# Patient Record
Sex: MALE | Race: WHITE | NOT HISPANIC OR LATINO | Employment: FULL TIME | URBAN - METROPOLITAN AREA
[De-identification: names, ages, dates, MRNs, and addresses within clinical notes are randomized per-mention and may not be internally consistent; named-entity substitution may affect disease eponyms.]

---

## 2017-01-03 ENCOUNTER — COMMUNICATION - HEALTHEAST (OUTPATIENT)
Dept: FAMILY MEDICINE | Facility: CLINIC | Age: 52
End: 2017-01-03

## 2017-04-03 ENCOUNTER — OFFICE VISIT - HEALTHEAST (OUTPATIENT)
Dept: FAMILY MEDICINE | Facility: CLINIC | Age: 52
End: 2017-04-03

## 2017-04-03 DIAGNOSIS — Z00.00 ROUTINE GENERAL MEDICAL EXAMINATION AT A HEALTH CARE FACILITY: ICD-10-CM

## 2017-04-03 DIAGNOSIS — Z00.00 ROUTINE HEALTH MAINTENANCE: ICD-10-CM

## 2017-04-03 DIAGNOSIS — Z87.19 HISTORY OF PANCREATITIS: ICD-10-CM

## 2017-04-03 DIAGNOSIS — R21 PENILE RASH: ICD-10-CM

## 2017-04-03 LAB
CHOLEST SERPL-MCNC: 139 MG/DL
FASTING STATUS PATIENT QL REPORTED: NO
HDLC SERPL-MCNC: 44 MG/DL
LDLC SERPL CALC-MCNC: 73 MG/DL
PSA SERPL-MCNC: 0.3 NG/ML (ref 0–3.5)
TRIGL SERPL-MCNC: 111 MG/DL

## 2017-04-03 ASSESSMENT — MIFFLIN-ST. JEOR: SCORE: 1789.47

## 2017-04-10 ENCOUNTER — COMMUNICATION - HEALTHEAST (OUTPATIENT)
Dept: FAMILY MEDICINE | Facility: CLINIC | Age: 52
End: 2017-04-10

## 2017-04-10 DIAGNOSIS — A60.00 GENITAL HERPES: ICD-10-CM

## 2017-05-15 ENCOUNTER — RECORDS - HEALTHEAST (OUTPATIENT)
Dept: ADMINISTRATIVE | Facility: OTHER | Age: 52
End: 2017-05-15

## 2017-05-16 ENCOUNTER — RECORDS - HEALTHEAST (OUTPATIENT)
Dept: ADMINISTRATIVE | Facility: OTHER | Age: 52
End: 2017-05-16

## 2017-06-15 ENCOUNTER — OFFICE VISIT - HEALTHEAST (OUTPATIENT)
Dept: FAMILY MEDICINE | Facility: CLINIC | Age: 52
End: 2017-06-15

## 2017-06-15 DIAGNOSIS — S29.011A STRAIN OF LEFT PECTORALIS MUSCLE: ICD-10-CM

## 2017-06-15 DIAGNOSIS — M77.11 LATERAL EPICONDYLITIS OF RIGHT ELBOW: ICD-10-CM

## 2017-06-15 DIAGNOSIS — A60.00 GENITAL HERPES: ICD-10-CM

## 2017-06-15 DIAGNOSIS — S46.219A BICEPS TENDON TEAR: ICD-10-CM

## 2017-06-15 ASSESSMENT — MIFFLIN-ST. JEOR: SCORE: 1786.07

## 2017-10-30 ENCOUNTER — OFFICE VISIT - HEALTHEAST (OUTPATIENT)
Dept: FAMILY MEDICINE | Facility: CLINIC | Age: 52
End: 2017-10-30

## 2017-10-30 ENCOUNTER — COMMUNICATION - HEALTHEAST (OUTPATIENT)
Dept: TELEHEALTH | Facility: CLINIC | Age: 52
End: 2017-10-30

## 2017-10-30 ENCOUNTER — COMMUNICATION - HEALTHEAST (OUTPATIENT)
Dept: FAMILY MEDICINE | Facility: CLINIC | Age: 52
End: 2017-10-30

## 2017-10-30 DIAGNOSIS — J02.9 PHARYNGITIS: ICD-10-CM

## 2017-10-30 DIAGNOSIS — J40 BRONCHITIS: ICD-10-CM

## 2017-10-30 ASSESSMENT — MIFFLIN-ST. JEOR: SCORE: 1798.54

## 2018-03-06 ENCOUNTER — OFFICE VISIT - HEALTHEAST (OUTPATIENT)
Dept: FAMILY MEDICINE | Facility: CLINIC | Age: 53
End: 2018-03-06

## 2018-03-06 ENCOUNTER — COMMUNICATION - HEALTHEAST (OUTPATIENT)
Dept: TELEHEALTH | Facility: CLINIC | Age: 53
End: 2018-03-06

## 2018-03-06 DIAGNOSIS — J06.9 URI (UPPER RESPIRATORY INFECTION): ICD-10-CM

## 2018-03-06 DIAGNOSIS — J02.9 SORE THROAT: ICD-10-CM

## 2018-03-06 LAB — DEPRECATED S PYO AG THROAT QL EIA: NORMAL

## 2018-03-06 ASSESSMENT — MIFFLIN-ST. JEOR: SCORE: 1862.04

## 2018-03-07 LAB — GROUP A STREP BY PCR: NORMAL

## 2018-08-13 ENCOUNTER — COMMUNICATION - HEALTHEAST (OUTPATIENT)
Dept: TELEHEALTH | Facility: CLINIC | Age: 53
End: 2018-08-13

## 2018-08-13 ENCOUNTER — OFFICE VISIT - HEALTHEAST (OUTPATIENT)
Dept: FAMILY MEDICINE | Facility: CLINIC | Age: 53
End: 2018-08-13

## 2018-08-13 DIAGNOSIS — R07.0 THROAT PAIN: ICD-10-CM

## 2018-08-13 DIAGNOSIS — J02.0 ACUTE STREPTOCOCCAL PHARYNGITIS: ICD-10-CM

## 2018-08-13 LAB — DEPRECATED S PYO AG THROAT QL EIA: ABNORMAL

## 2018-09-18 ENCOUNTER — OFFICE VISIT - HEALTHEAST (OUTPATIENT)
Dept: FAMILY MEDICINE | Facility: CLINIC | Age: 53
End: 2018-09-18

## 2018-09-18 DIAGNOSIS — H61.23 BILATERAL IMPACTED CERUMEN: ICD-10-CM

## 2018-09-18 ASSESSMENT — MIFFLIN-ST. JEOR: SCORE: 1834.83

## 2019-01-22 ENCOUNTER — OFFICE VISIT - HEALTHEAST (OUTPATIENT)
Dept: FAMILY MEDICINE | Facility: CLINIC | Age: 54
End: 2019-01-22

## 2019-01-22 ENCOUNTER — COMMUNICATION - HEALTHEAST (OUTPATIENT)
Dept: TELEHEALTH | Facility: CLINIC | Age: 54
End: 2019-01-22

## 2019-01-22 DIAGNOSIS — J02.9 PHARYNGITIS, UNSPECIFIED ETIOLOGY: ICD-10-CM

## 2019-01-22 DIAGNOSIS — J40 BRONCHITIS: ICD-10-CM

## 2019-05-13 ENCOUNTER — OFFICE VISIT - HEALTHEAST (OUTPATIENT)
Dept: FAMILY MEDICINE | Facility: CLINIC | Age: 54
End: 2019-05-13

## 2019-05-13 DIAGNOSIS — J40 BRONCHITIS: ICD-10-CM

## 2019-05-13 DIAGNOSIS — J02.9 PHARYNGITIS, UNSPECIFIED ETIOLOGY: ICD-10-CM

## 2019-05-13 DIAGNOSIS — M54.2 CERVICAL PAIN: ICD-10-CM

## 2019-11-12 ENCOUNTER — OFFICE VISIT - HEALTHEAST (OUTPATIENT)
Dept: FAMILY MEDICINE | Facility: CLINIC | Age: 54
End: 2019-11-12

## 2019-11-12 DIAGNOSIS — D12.6 ADENOMATOUS POLYP OF COLON, UNSPECIFIED PART OF COLON: ICD-10-CM

## 2019-11-12 DIAGNOSIS — R10.9 ABDOMINAL PAIN, UNSPECIFIED ABDOMINAL LOCATION: ICD-10-CM

## 2019-11-12 DIAGNOSIS — Z87.19 HISTORY OF PANCREATITIS: ICD-10-CM

## 2019-11-12 LAB
ALBUMIN SERPL-MCNC: 4 G/DL (ref 3.5–5)
ALP SERPL-CCNC: 84 U/L (ref 45–120)
ALT SERPL W P-5'-P-CCNC: 14 U/L (ref 0–45)
AST SERPL W P-5'-P-CCNC: 17 U/L (ref 0–40)
BASOPHILS # BLD AUTO: 0 THOU/UL (ref 0–0.2)
BASOPHILS NFR BLD AUTO: 1 % (ref 0–2)
BILIRUB DIRECT SERPL-MCNC: 0.2 MG/DL
BILIRUB SERPL-MCNC: 0.5 MG/DL (ref 0–1)
EOSINOPHIL # BLD AUTO: 0.2 THOU/UL (ref 0–0.4)
EOSINOPHIL NFR BLD AUTO: 4 % (ref 0–6)
ERYTHROCYTE [DISTWIDTH] IN BLOOD BY AUTOMATED COUNT: 12.2 % (ref 11–14.5)
HCT VFR BLD AUTO: 39 % (ref 40–54)
HGB BLD-MCNC: 13.5 G/DL (ref 14–18)
LIPASE SERPL-CCNC: 20 U/L (ref 0–52)
LYMPHOCYTES # BLD AUTO: 1.5 THOU/UL (ref 0.8–4.4)
LYMPHOCYTES NFR BLD AUTO: 24 % (ref 20–40)
MCH RBC QN AUTO: 30 PG (ref 27–34)
MCHC RBC AUTO-ENTMCNC: 34.5 G/DL (ref 32–36)
MCV RBC AUTO: 87 FL (ref 80–100)
MONOCYTES # BLD AUTO: 0.4 THOU/UL (ref 0–0.9)
MONOCYTES NFR BLD AUTO: 7 % (ref 2–10)
NEUTROPHILS # BLD AUTO: 3.9 THOU/UL (ref 2–7.7)
NEUTROPHILS NFR BLD AUTO: 64 % (ref 50–70)
PLATELET # BLD AUTO: 230 THOU/UL (ref 140–440)
PMV BLD AUTO: 7.6 FL (ref 7–10)
PROT SERPL-MCNC: 7.1 G/DL (ref 6–8)
RBC # BLD AUTO: 4.48 MILL/UL (ref 4.4–6.2)
WBC: 6.1 THOU/UL (ref 4–11)

## 2019-11-12 ASSESSMENT — MIFFLIN-ST. JEOR: SCORE: 1820.09

## 2019-11-15 ENCOUNTER — HOSPITAL ENCOUNTER (OUTPATIENT)
Dept: CT IMAGING | Facility: HOSPITAL | Age: 54
Discharge: HOME | End: 2019-11-15
Attending: FAMILY MEDICINE

## 2019-11-15 ENCOUNTER — COMMUNICATION - HEALTHEAST (OUTPATIENT)
Dept: TELEHEALTH | Facility: CLINIC | Age: 54
End: 2019-11-15

## 2019-11-15 DIAGNOSIS — Z87.19 HISTORY OF PANCREATITIS: ICD-10-CM

## 2019-11-15 DIAGNOSIS — R10.9 ABDOMINAL PAIN, UNSPECIFIED ABDOMINAL LOCATION: ICD-10-CM

## 2020-11-04 ENCOUNTER — VIRTUAL VISIT (OUTPATIENT)
Dept: FAMILY MEDICINE | Facility: OTHER | Age: 55
End: 2020-11-04

## 2020-11-04 NOTE — PROGRESS NOTES
"Date: 2020 12:22:48  Clinician: Lexie Hodgson  Clinician NPI: [id number]  Patient: Evangelist Singh  Patient : 1965  Patient Address: [address], [place] [address]  Patient Phone: [phone]  Visit Protocol: URI  Patient Summary:  Evangelist is a 55 year old ( : 1965 ) male who initiated a [facility] Visit for COVID-19 (Coronavirus) evaluation and screening. When asked the question \"Please sign me up to receive news, health information and promotions from [facility].\", Evangelist responded \"Yes\".    When asked when his symptoms started, Evnagelist reported that he does not have any symptoms.   He denies taking antibiotic medication in the past month and having recent facial or sinus surgery in the past 60 days.    Pertinent COVID-19 (Coronavirus) information  Evangelist does not work or volunteer as healthcare worker or a . In the past 14 days, Evangelist has not worked or volunteered at a healthcare facility or group living setting.   In the past 14 days, he also has not lived in a congregate living setting.   Evangelist has had a close contact with a laboratory-confirmed COVID-19 patient in the last 14 days. He was not exposed at his work. Date Evangelist was exposed to the laboratory-confirmed COVID-19 patient: 2020   Additional information about contact with COVID-19 (Coronavirus) patient as reported by the patient (free text): [place] Simons, in [facility]   Evangelist is not living in the same household with the COVID-19 positive patient. He was in an enclosed space for greater than 15 minutes with the COVID-19 patient.   During the encounter, neither were wearing masks.   Since 2019, Evangelist has not been tested for COVID-19 and has not had upper respiratory infection or influenza-like illness.   Pertinent medical history  Evangelist needs a return to work/school note.   Weight: 190 lbs   Evangelist does not smoke or use smokeless tobacco.   Weight: 190 lbs    MEDICATIONS: No current medications, ALLERGIES: " NKDA  Clinician Response:  Dear Evangelist,   Based on your exposure to COVID-19 (coronavirus), we would like to test you for this virus.  1. Please call [phone] to schedule your visit. Explain that you were referred by [facility] to have a COVID-19 test. Be ready to share [facility].  * If you need to schedule in [facility] please call [phone] or for [place] employees please call [phone].   * If you need to schedule in the [place] area please call [phone]. [place] employees call [phone].   The following will serve as your written order for this COVID Test, ordered by me, for the indication of suspected COVID [Z20.828]: The test will be ordered in [employer], our electronic health record, after you are scheduled. It will show as ordered and authorized by Jairo Mederos MD.  Order: COVID-19 (coronavirus) PCR for ASYMPTOMATIC EXPOSURE testing from [facility].   If you know you have had close contact with someone who tested positive, you should be quarantined for 14 days after this exposure. You should stay in quarantine for the14 days even if the covid test is negative, the optimal time to test after exposure is 5-7 days from the exposure  Quarantine means   What should I do?  For safety, it's very important to follow these rules. Do this for 14 days after the date you were last exposed to the virus..  Stay home and away from others. Don't go to school or anywhere else. Generally quarantine means staying home from work but there are some exceptions to this. Please contact your workplace.   No hugging, kissing or shaking hands.  Don't let anyone visit.  Cover your mouth and nose with a mask, tissue or washcloth to avoid spreading germs.  Wash your hands and face often. Use soap and water.  What are the symptoms of COVID-19?  The most common symptoms are cough, fever and trouble breathing. Less common symptoms include headache, body aches, fatigue (feeling very tired), chills, sore throat, stuffy  or runny nose, diarrhea (loose poop), loss of taste or smell, belly pain, and nausea or vomiting (feeling sick to your stomach or throwing up).  After 14 days, if you have still don't have symptoms, you likely don't have this virus.  If you develop symptoms, follow these guidelines.  If you're normally healthy: Please start another [facility] visit to report your symptoms. Go to [email or web address].  If you have a serious health problem (like cancer, heart failure, an organ transplant or kidney disease): Call your specialty clinic. Let them know that you might have COVID-19.  2. When it's time for your COVID test:  Stay at least 6 feet away from others. (If someone will drive you to your test, stay in the backseat, as far away from the  as you can.)  Cover your mouth and nose with a mask, tissue or washcloth.  Go straight to the testing site. Don't make any stops on the way there or back.  Please note  Caregivers in these groups are at risk for severe illness due to COVID-19:  o People 65 years and older  o People who live in a nursing home or long-term care facility  o People with chronic disease (lung, heart, cancer, diabetes, kidney, liver, immunologic)  o People who have a weakened immune system, including those who:  Are in cancer treatment  Take medicine that weakens the immune system, such as corticosteroids  Had a bone marrow or organ transplant  Have an immune deficiency  Have poorly controlled HIV or AIDS  Are obese (body mass index of 40 or higher)  Smoke regularly  Where can I get more information?   [employer] -- About COVID-19: [email or web address]  CDC -- What to Do If You're Sick: www.cdc.gov/coronavirus/2019-ncov/about/steps-when-sick.html  CDC -- Ending Home Isolation: www.cdc.gov/coronavirus/2019-ncov/hcp/disposition-in-home-patients.html  CDC -- Caring for Someone: www.cdc.gov/coronavirus/2019-ncov/if-you-are-sick/care-for-someone.html  [facility] -- Interim Guidance for Hospital Discharge to  Home: www.health.[facility].[place]./diseases/coronavirus/hcp/hospdischarge.pdf  [facility] clinical trials (COVID-19 research studies): clinicalaffairs.[facility]/umn-clinical-trials  Below are the COVID-19 hotlines at the [place] Department of Health ([facility]). Interpreters are available.  For health questions: Call 195-900-4743 or 1-603.474.3287 (7 a.m. to 7 p.m.)  For questions about schools and childcare: Call 936-726-5214 or 1-850.494.7899 (7 a.m. to 7 p.m.)    Diagnosis: Contact with and (suspected) exposure to other viral communicable diseases  Diagnosis ICD: Z20.828

## 2020-11-11 ENCOUNTER — AMBULATORY - HEALTHEAST (OUTPATIENT)
Dept: FAMILY MEDICINE | Facility: CLINIC | Age: 55
End: 2020-11-11

## 2020-11-11 DIAGNOSIS — Z20.822 SUSPECTED COVID-19 VIRUS INFECTION: ICD-10-CM

## 2020-11-12 ENCOUNTER — AMBULATORY - HEALTHEAST (OUTPATIENT)
Dept: FAMILY MEDICINE | Facility: CLINIC | Age: 55
End: 2020-11-12

## 2020-11-12 DIAGNOSIS — Z20.822 SUSPECTED COVID-19 VIRUS INFECTION: ICD-10-CM

## 2020-11-14 ENCOUNTER — COMMUNICATION - HEALTHEAST (OUTPATIENT)
Dept: SCHEDULING | Facility: CLINIC | Age: 55
End: 2020-11-14

## 2020-11-14 ENCOUNTER — COMMUNICATION - HEALTHEAST (OUTPATIENT)
Dept: LAB | Facility: CLINIC | Age: 55
End: 2020-11-14

## 2020-11-17 ENCOUNTER — OFFICE VISIT - HEALTHEAST (OUTPATIENT)
Dept: FAMILY MEDICINE | Facility: CLINIC | Age: 55
End: 2020-11-17

## 2020-11-17 DIAGNOSIS — U07.1 2019 NOVEL CORONAVIRUS DISEASE (COVID-19): ICD-10-CM

## 2021-04-29 ENCOUNTER — AMBULATORY - HEALTHEAST (OUTPATIENT)
Dept: NURSING | Facility: CLINIC | Age: 56
End: 2021-04-29

## 2021-05-20 ENCOUNTER — AMBULATORY - HEALTHEAST (OUTPATIENT)
Dept: NURSING | Facility: CLINIC | Age: 56
End: 2021-05-20

## 2021-05-28 NOTE — PROGRESS NOTES
Subjective: Patient comes in for evaluation this 54-year-old male has had a sore throat cough coughing up some colored phlegm.    Patient denies any fever denies any ear pain.    He is been sick for about a week    Patient also said some discomfort in through the right neck patient has muscle strain.  Seems to be localized there is no radicular component no adenopathy posteriorly please see below    Denies any shortness of breath denies any hemoptysis.        Tobacco status: He  reports that he has never smoked. He has never used smokeless tobacco.    Patient Active Problem List    Diagnosis Date Noted     Adenomatous colon polyp 05/22/2017       Current Outpatient Medications   Medication Sig Dispense Refill     amoxicillin-clavulanate (AUGMENTIN) 875-125 mg per tablet Take 1 tablet by mouth 2 (two) times a day for 10 days. 20 tablet 0     No current facility-administered medications for this visit.        ROS: 10 point review of systems positive as discussed above otherwise negative    Objective:    /79 (Patient Site: Left Arm, Patient Position: Sitting, Cuff Size: Adult Large)   Pulse 68   Resp 18   Wt 210 lb (95.3 kg)   BMI 27.33 kg/m    Body mass index is 27.33 kg/m .    General appearance no acute distress vital signs are stable he is afebrile, temp is 98    Neck with mild anterior adenopathy, slightly tender.    Pupils react normally canals and TMs were normal    Oropharynx mild erythema no exudate    Lungs coarse breath sounds cleared with cough    Heart was regular S1-S2    Right neck posterior had some tenderness to palpation good range of motion with the neck little discomfort when turning towards the right but no radicular component.    Skin was normal no rash        Assessment:  1. Bronchitis  amoxicillin-clavulanate (AUGMENTIN) 875-125 mg per tablet   2. Pharyngitis, unspecified etiology  amoxicillin-clavulanate (AUGMENTIN) 875-125 mg per tablet   3. Cervical pain       Treated with Augmentin  for the bronchitis/pharyngitis.  875 mg twice daily with food for 10 days    Cervical pain/strain.  Range of motion exercises use Tylenol  Plan: Follow-up as needed    This transcription uses voice recognition software, which may contain typographical errors.

## 2021-05-30 VITALS — BODY MASS INDEX: 25.54 KG/M2 | WEIGHT: 199 LBS | HEIGHT: 74 IN

## 2021-05-31 VITALS — WEIGHT: 201 LBS | BODY MASS INDEX: 25.8 KG/M2 | HEIGHT: 74 IN

## 2021-05-31 VITALS — BODY MASS INDEX: 26.51 KG/M2 | WEIGHT: 200 LBS | HEIGHT: 73 IN

## 2021-06-01 VITALS — BODY MASS INDEX: 27.59 KG/M2 | HEIGHT: 74 IN | WEIGHT: 215 LBS

## 2021-06-01 VITALS — WEIGHT: 207 LBS | BODY MASS INDEX: 26.94 KG/M2

## 2021-06-02 VITALS — BODY MASS INDEX: 27.46 KG/M2 | WEIGHT: 211 LBS

## 2021-06-02 VITALS — WEIGHT: 209 LBS | BODY MASS INDEX: 26.82 KG/M2 | HEIGHT: 74 IN

## 2021-06-03 VITALS — WEIGHT: 210 LBS | BODY MASS INDEX: 27.33 KG/M2

## 2021-06-03 NOTE — PROGRESS NOTES
"Subjective: Patient comes in for evaluation this 54-year-old male has had some abdominal pain over the last 2 to 3 weeks.  He is felt some constipation he felt some bloating abdomen pain is somewhat diffuse sometimes in the lower sometimes more in the mid abdomen.    He does have a history of acute pancreatitis back in July 2016, had normal lipase but CT scan was abnormal.  It did show improvement on follow-up later that summer.    He also had a colonoscopy in 2017 had an adenomatous polyp is due again in May 2022.    Patient states that he changed his diet and things have improved but still has some ongoing discomfort.    He is going with a low-fat diet.    I did check labs with CBC lipase and LFTs.  We will also recheck a CT scan of the abdomen and pelvis.  Please see below.    Tobacco status: He  reports that he has never smoked. He has never used smokeless tobacco.    Patient Active Problem List    Diagnosis Date Noted     Adenomatous colon polyp 05/22/2017       No current outpatient medications on file.     No current facility-administered medications for this visit.        ROS: 10 point review of systems positive as outlined above otherwise negative    Objective:    /70 (Patient Site: Left Arm, Patient Position: Sitting, Cuff Size: Adult Regular)   Pulse (!) 55   Temp 98.1  F (36.7  C) (Oral)   Resp 16   Ht 6' 2\" (1.88 m)   Wt 204 lb (92.5 kg)   SpO2 100%   BMI 26.19 kg/m    Body mass index is 26.19 kg/m .      General appearance no acute distress    HEENT neck is supple oropharynx clear    Lungs clear no rales or rhonchi heart was regular S1-S2.    Abdomen is soft no guarding or rebound but has discomfort in the mid.  Occasional back discomfort he states as well    Lower extremities without edema skin was normal no jaundice change hemoglobin 13.5 white count 6100 normal platelets.    Lipase and LFTs pending    CT scan of abdomen pelvis also ordered.    Results for orders placed or performed in " visit on 11/12/19   HM1 (CBC with Diff)   Result Value Ref Range    WBC 6.1 4.0 - 11.0 thou/uL    RBC 4.48 4.40 - 6.20 mill/uL    Hemoglobin 13.5 (L) 14.0 - 18.0 g/dL    Hematocrit 39.0 (L) 40.0 - 54.0 %    MCV 87 80 - 100 fL    MCH 30.0 27.0 - 34.0 pg    MCHC 34.5 32.0 - 36.0 g/dL    RDW 12.2 11.0 - 14.5 %    Platelets 230 140 - 440 thou/uL    MPV 7.6 7.0 - 10.0 fL    Neutrophils % 64 50 - 70 %    Lymphocytes % 24 20 - 40 %    Monocytes % 7 2 - 10 %    Eosinophils % 4 0 - 6 %    Basophils % 1 0 - 2 %    Neutrophils Absolute 3.9 2.0 - 7.7 thou/uL    Lymphocytes Absolute 1.5 0.8 - 4.4 thou/uL    Monocytes Absolute 0.4 0.0 - 0.9 thou/uL    Eosinophils Absolute 0.2 0.0 - 0.4 thou/uL    Basophils Absolute 0.0 0.0 - 0.2 thou/uL       Assessment:  1. Abdominal pain, unspecified abdominal location  Lipase    HM1(CBC and Differential)    Hepatic Profile    CT Abdomen Pelvis With Oral With IV Contrast    HM1 (CBC with Diff)   2. History of pancreatitis  Lipase    CT Abdomen Pelvis With Oral With IV Contrast   3. Adenomatous polyp of colon, unspecified part of colon       Testing abdominal pain with history of pancreatitis in the past please see above discussion    Continue low-fat diet.    Colonoscopy due again May 2022    Plan: As outlined above    This transcription uses voice recognition software, which may contain typographical errors.

## 2021-06-04 VITALS
HEIGHT: 74 IN | RESPIRATION RATE: 16 BRPM | HEART RATE: 55 BPM | DIASTOLIC BLOOD PRESSURE: 70 MMHG | BODY MASS INDEX: 26.18 KG/M2 | WEIGHT: 204 LBS | SYSTOLIC BLOOD PRESSURE: 100 MMHG | TEMPERATURE: 98.1 F | OXYGEN SATURATION: 100 %

## 2021-06-09 NOTE — PROGRESS NOTES
"Subjective: This patient comes in for evaluation is a 52-year-old male patient had some diarrhea back in December that has now resolved    He has had some previous pancreatitis but that has resolved also.    He does need a colonoscopy again referral    He had had some labs in December including CMP which was normal CBC normal lipase normal    Additional labs today include PSA and lipid.    Only concern has to do with a rash at the base of the shaft of the penis.  It itches he scratched it some    There is some scabs there there is no vesicular component I did swab it for herpes.  He does not have any inguinal adenopathy testes descended normally.    Please see below.    He occasionally gets some left buttocks pain in through the hamstring insertion otherwise no musculoskeletal issues.    He additionally has on his right foot a coronary trim this with a 15 blade he will try to keep that trimmed down with a pumice stone.  He has a small protrusion off the fifth metatarsal head area.    Tobacco status: He  reports that he has never smoked. He has never used smokeless tobacco.    There are no active problems to display for this patient.      No current outpatient prescriptions on file.     No current facility-administered medications for this visit.        ROS:   10 point review of systems negative other than as outlined above    Objective:    /70 (Patient Site: Right Arm, Patient Position: Sitting, Cuff Size: Adult Large)  Pulse 72  Temp 98  F (36.7  C) (Oral)   Resp 16  Ht 6' 1.5\" (1.867 m)  Wt 199 lb (90.3 kg)  BMI 25.9 kg/m2  Body mass index is 25.9 kg/(m^2).      General appearance no acute distress.    HEENT: Oropharynx is clear pupils react normally canals and TMs normal mild wax.    Neck without adenopathy no bruit no thyroid enlargement    Lungs clear no rales or rhonchi, heart regular S1-S2.    Abdomen is soft bowel sounds are normal no masses no tenderness no guarding rebound    Genital exam as " outlined above no hernia no inguinal adenopathy    Rashes outlined in the shaft of the penis.    Skin otherwise normal    No tenderness in through the back or buttocks area negative straight leg raising normal pulses no edema.    Off the edge of the right fifth metatarsal he does have a corn which was trimmed off with a 15 blade no complication.    Labs PSA and lipid pending    Also herpes culture obtained but there was not much drainage.        Assessment:  1. Routine general medical examination at a health care facility  Ambulatory referral for Colonoscopy    Lipid Cascade RANDOM    PSA (Prostatic-Specific Antigen), Annual Screen   2. Routine health maintenance     3. History of pancreatitis     4. Penile rash  Herpes Simplex PCR (not CSF)     Stable physical    Await labs    No sign of any pancreatitis or diarrhea.    Health maintenance we will get colonoscopy    Plan: As outlined above patient will be contacted on his cell at [phone]    This transcription uses voice recognition software, which may contain typographical errors.

## 2021-06-11 NOTE — PROGRESS NOTES
"Subjective: This patient comes in for evaluation is a 52-year-old male patient has had some discomfort in through his left biceps inner arm and pectoralis area.  He has started to lift some weights using a kettle ball which was new this occurred over the last week.  He denies any other specific injury although he does work manual labor and has noticed a little bit of discomfort with activity    No shortness of breath no chest pressure at rest.    Also said some discomfort through the right elbow in through the lateral epicondyle.  He has been playing [employer] recently and had just started doing this also.    Tobacco status: He  reports that he has never smoked. He has never used smokeless tobacco.    Patient Active Problem List    Diagnosis Date Noted     Adenomatous colon polyp 05/22/2017       No current outpatient prescriptions on file.     No current facility-administered medications for this visit.        ROS:   Review of systems negative other than as outlined above    Objective:    /70 (Patient Site: Right Arm, Patient Position: Sitting, Cuff Size: Adult Regular)  Pulse 60  Temp 97  F (36.1  C) (Oral)   Resp 16  Ht 6' 1\" (1.854 m)  Wt 200 lb (90.7 kg)  BMI 26.39 kg/m2  Body mass index is 26.39 kg/(m^2).      General appearance no acute distress    Tenderness in through the right lateral epicondyle on palpation also some discomfort with dorsiflexion.    He has a little discomfort through the left pectoralis and also looks like he has this partial biceps tear and through the insertion at the elbow of the biceps tendon.    He has normal strength through the biceps triceps deltoid and pectoralis.    No rashes.    Lungs were clear no rales or rhonchi heart was regular S1-S2 rate in the 60s blood pressure look good        Assessment:  1. Lateral epicondylitis of right elbow     2. Strain of left pectoralis muscle     3. Biceps tendon tear     4. Genital herpes       Lateral epicondylitis right elbow also " strain of left pectoralis muscle and biceps tear of the left muscle.    Also had discussion regarding the genital herpes he has not had any further symptoms he actually was clearing up so he did not go on the famciclovir that which was prescribed.  I did discuss taking that if he gets recurrence discussed safe sex regarding herpes etc.    Plan: As outlined above, will use some anti-inflammatories discussed overuse phenomenon    Discussed that I do not think any treatment is indicated for the partial biceps tendon disruption.    Follow-up as needed    This transcription uses voice recognition software, which may contain typographical errors.

## 2021-06-13 NOTE — TELEPHONE ENCOUNTER
Coronavirus (COVID-19) Notification    Reason for call  Notify of POSITIVE  COVID-19 lab result, assess symptoms,  review [facility] recommendations    Lab Result   Lab test for 2019-nCoV rRt-PCR or SARS-COV-2 PCR  Oropharyngeal AND/OR nasopharyngeal swabs were POSITIVE for 2019-nCoV RNA [OR] SARS-COV-2 RNA (COVID-19) RNA     We have been unable to reach Patient by phone at this time to notify of their Positive COVID-19 result.  Left voicemail message requesting a call back to [phone] [facility] for results.        POSITIVE COVID-19 Letter sent.    [Name]  Kamini Dersa RN

## 2021-06-13 NOTE — TELEPHONE ENCOUNTER
Patient needs to schedule virtual visit with me.    Return to work is not based on a second test, as based on a number of things like how long he has had symptoms when the fever was how he is doing now etc.    Please schedule virtual visit

## 2021-06-13 NOTE — TELEPHONE ENCOUNTER
Future Appointments     Provider Department Center   11/17/2020 2:00 PM (Arrive by 1:45 PM) Chidi Cortez MD [facility]

## 2021-06-13 NOTE — PROGRESS NOTES
"Subjective: This patient comes in for evaluation.  This is a 52-year-old male.  His son has been sick for about 6 weeks with sore throat and cough congestion.    Patient developed symptoms about 3-4 weeks ago he initially had some congestion then he had a slight cough and he got a sore throat he seemed to get better and then got worse again now coughing up some yellowish phlegm.    He may have been exposed to strep and a neighbor.  He thinks the neighbor is a carrier of strep.    Otherwise denies fever chills rash denies any diarrhea or vomiting    Tobacco status: He  reports that he has never smoked. He has never used smokeless tobacco.    Patient Active Problem List    Diagnosis Date Noted     Adenomatous colon polyp 05/22/2017       Current Outpatient Prescriptions   Medication Sig Dispense Refill     azithromycin (ZITHROMAX Z-KATHYA) 250 MG tablet Take 2 tablets (500 mg) on  Day 1,  followed by 1 tablet (250 mg) once daily on Days 2 through 5. 6 tablet 0     No current facility-administered medications for this visit.        ROS:   Review of systems negative other than as outlined above    Objective:    /78 (Patient Site: Right Arm, Patient Position: Sitting, Cuff Size: Adult Regular)  Pulse (!) 59  Temp 97  F (36.1  C) (Oral)   Resp 16  Ht 6' 1.5\" (1.867 m)  Wt 201 lb (91.2 kg)  SpO2 96% Comment: at rest with room air  BMI 26.16 kg/m2  Body mass index is 26.16 kg/(m^2).      General appearance no acute distress.    HEENT neck without adenopathy oropharynx is thickened for erythema no significant adenopathy in through the neck.  No exudate    Strep was negative    Canals and TMs normal    Lungs some coarse breath sounds, no rales or rhonchi heart regular S1-S2 rate in the 60 range    O2 sat 96%.    Skin was normal    Results for orders placed or performed in visit on 10/30/17   Rapid Strep A Screen-Throat   Result Value Ref Range    Rapid Strep A Antigen No Group A Strep detected, presumptive negative " No Group A Strep detected, presumptive negative       Assessment:  1. Bronchitis  azithromycin (ZITHROMAX Z-KATHYA) 250 MG tablet   2. Pharyngitis  Rapid Strep A Screen-Throat    Group A Strep, RNA Direct Detection, Throat     Patient has some bronchitis with some secondary bacterial infection symptoms we will treat with azithromycin push fluids get rest    Plan: Follow up if not improved    This transcription uses voice recognition software, which may contain typographical errors.

## 2021-06-13 NOTE — TELEPHONE ENCOUNTER
Pt is calling in about his Covid 19 results. Pt had test done Thursday and it was positive, and he was told to call triage. Discussed isolation, and home care measures. Pt reports fever is gone, and headache is starting to subside.     Pt would like to have another Covid 19 test ordered for himself so he can return to work,  and if possible also for his 2 sons.     Please call Evangelist at [phone]    Fox Ulrich RN [facility] Triage/Medication Refill

## 2021-06-13 NOTE — PROGRESS NOTES
"Evangelist Singh is a 55 y.o. male who is being evaluated via a billable video visit.      The patient has been notified of following:     \"This video visit will be conducted via a call between you and your physician/provider. We have found that certain health care needs can be provided without the need for an in-person physical exam.  This service lets us provide the care you need with a video conversation.  If a prescription is necessary we can send it directly to your pharmacy.  If lab work is needed we can place an order for that and you can then stop by our lab to have the test done at a later time.    Video visits are billed at different rates depending on your insurance coverage. Please reach out to your insurance provider with any questions.    If during the course of the call the physician/provider feels a video visit is not appropriate, you will not be charged for this service.\"    Patient has given verbal consent to a Video visit? Yes  How would you like to obtain your AVS? AVS Preference: MyChart.  If dropped by the video visit, the video invitation should be sent to: Text to cell phone: [phone]   Will anyone else be joining your video visit? No        Video Start Time: 2:19 pm    Additional provider notes    Subjective: Patient had a virtual visit, video, due to the coronavirus pandemic.    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    Patient had fever on the 10th and 11th and a cough really no real shortness of breath but gets winded a little easier a little more fatigued.    No further muscle aches he has had some head funny feeling like dizziness no real congestion smell and taste are too bad    No rash no swollen legs.    He is now about " 7 days since the beginning of symptoms he is definitely improving.    If he continues to improve and is essentially symptom-free he should be able to return to work on 11/21/2020 based on clinical criteria.    We will have his son Tiago and his stepson Farrukh get checked also I put in orders for them    Tobacco status: He  reports that he has never smoked. He has never used smokeless tobacco.    Patient Active Problem List    Diagnosis Date Noted     Adenomatous colon polyp 05/22/2017       No current outpatient medications on file.     No current facility-administered medications for this visit.        ROS:   10 point review of systems positive as outlined above otherwise negative    Objective:    There were no vitals taken for this visit.  There is no height or weight on file to calculate BMI.      General appearance: No acute distress    HEENT no nasal congestion no sore throat    Is not having any coughing    Lungs: Nonlabored breathing no wheezing.    Heart: No palpitations or rapid heart rate    No leg swelling no skin rashes.    Abdomen nontender no GI issues.  No swelling    Results for orders placed or performed in visit on 11/12/20   COVID-19 Virus PCR MRF    Specimen: Nasopharyngeal   Result Value Ref Range    COVID-19 VIRUS SPECIMEN SOURCE Nasopharyngeal     2019-nCOV Detected, Abnormal Result (!!)        Assessment:  1. 2019 novel coronavirus disease (COVID-19)       COVID-19 please see above discussion    Plan: As outlined above, able to return to work on November 21 if symptom-free    This transcription uses voice recognition software, which may contain typographical errors.      Video-Visit Details    Type of service:  Video Visit    Video End Time (time video stopped): 2:34 PM  Originating Location (pt. Location): Home    Distant Location (provider location):  [facility]     Platform used for Video Visit: Terry Cortez MD

## 2021-06-15 PROBLEM — D12.6 ADENOMATOUS COLON POLYP: Status: ACTIVE | Noted: 2017-05-22

## 2021-06-16 NOTE — PROGRESS NOTES
"Subjective: Patient comes in for evaluation he has been exposed to strep.  He has a red throat is been running a low-grade fever.  He does have some mild anterior adenopathy    He was exposed to his girlfriend's daughter.    He has had a headache some slight discomfort through the ears    Mild cough nonproductive no myalgias symptoms.    Tobacco status: He  reports that he has never smoked. He has never used smokeless tobacco.    Patient Active Problem List    Diagnosis Date Noted     Adenomatous colon polyp 05/22/2017       Current Outpatient Prescriptions   Medication Sig Dispense Refill     azithromycin (ZITHROMAX Z-KATHYA) 250 MG tablet Take 2 tablets (500 mg) on  Day 1,  followed by 1 tablet (250 mg) once daily on Days 2 through 5. 6 tablet 0     No current facility-administered medications for this visit.        ROS:   Review of systems negative other than as outlined above    Objective:    /76 (Patient Site: Left Arm, Patient Position: Sitting, Cuff Size: Adult Large)  Pulse 64  Temp 97.5  F (36.4  C) (Oral)   Resp 12  Ht 6' 1.5\" (1.867 m)  Wt 215 lb (97.5 kg) Comment: with his snow boots  SpO2 95% Comment: at rest with room air  BMI 27.98 kg/m2  Body mass index is 27.98 kg/(m^2).      General appearance no acute distress    HEENT neck was supple he did have some anterior adenopathy slightly tender no posterior nodes    Canals and TMs normal    Oropharynx with quite erythematous posterior pharynx and tonsillar area, no exudate no abscess    Lungs are clear no rales or rhonchi, heart was regular rate in the 60s O2 sat 95%.        Results for orders placed or performed in visit on 03/06/18   Rapid Strep A Screen-Throat   Result Value Ref Range    Rapid Strep A Antigen No Group A Strep detected, presumptive negative No Group A Strep detected, presumptive negative       Assessment:  1. URI (upper respiratory infection)  azithromycin (ZITHROMAX Z-KATHYA) 250 MG tablet   2. Sore throat  Rapid Strep A " Screen-Throat    Group A Strep, RNA Direct Detection, Throat     We will treat with azithromycin a pack    Push fluids get rest use Tylenol    Office today follow-up if not improving    Plan: As discussed    This transcription uses voice recognition software, which may contain typographical errors.

## 2021-06-19 NOTE — PROGRESS NOTES
Subjective: Patient comes in for evaluation this 53-year-old male has had a sore throats been going on for about 3-4 days has a headache trouble swallowing mild cough is felt warm at times no real fever    No vomiting or nausea    No rashes.    Tobacco status: He  reports that he has never smoked. He has never used smokeless tobacco.    Patient Active Problem List    Diagnosis Date Noted     Adenomatous colon polyp 05/22/2017       Current Outpatient Prescriptions   Medication Sig Dispense Refill     amoxicillin (AMOXIL) 875 MG tablet Take 1 tablet (875 mg total) by mouth 2 (two) times a day for 10 days. 20 tablet 0     No current facility-administered medications for this visit.        ROS:   Review of systems positive as outlined otherwise negative    Objective:    /74 (Patient Site: Right Arm, Patient Position: Sitting, Cuff Size: Adult Large)  Pulse 72  Temp 98.7  F (37.1  C) (Oral)   Resp 20  Wt 207 lb (93.9 kg)  BMI 26.94 kg/m2  Body mass index is 26.94 kg/(m^2).      General appearance tired    Afebrile    Neck with some mild anterior nodes no posterior nodes    Oropharynx with erythema posteriorly no exudate    Lungs clear no rales or rhonchi heart regular rate at 70    Skin without rash    No peripheral edema    Results for orders placed or performed in visit on 08/13/18   Rapid Strep A Screen- Throat Swab   Result Value Ref Range    Rapid Strep A Antigen Group A Strep detected (!) No Group A Strep detected, presumptive negative       Assessment:  1. Acute streptococcal pharyngitis  amoxicillin (AMOXIL) 875 MG tablet   2. Throat pain  Rapid Strep A Screen- Throat Swab     Acute strep we will treat with amoxicillin follow-up for 10 days.  Follow-up as needed    Plan: Use Tylenol or Advil    This transcription uses voice recognition software, which may contain typographical errors.

## 2021-06-20 NOTE — PROGRESS NOTES
Subjective:    Evangelist Singh is a 53 y.o. male who presents for evaluation of possible cerumen impaction.  He has had fullness in both of his ears for about 1 week.  First it started with his right ear, now he takes his left ear is starting.  Initially the hearing loss and fullness came and went.  Now he thinks it is more consistent.  He occasionally has very slight pain.  He thinks he may have a little bit of fluid drainage.  He is tried a few over-the-counter remedies such as hydrogen peroxide drops and Q-tips, but they have not been helpful.  No recent fevers or cough/cold symptoms.  He was treated for strep throat in August with amoxicillin.    Patient Active Problem List   Diagnosis     Adenomatous colon polyp     No current outpatient prescriptions on file.     Objective:   Allergies:  Review of patient's allergies indicates no known allergies.    Vitals:  Vitals:    09/18/18 0821   BP: 122/60   Pulse: (!) 55   SpO2: 97%     Body mass index is 27.2 kg/(m^2).    Vital signs reviewed.  General: Patient is alert and oriented x 3, in no apparent distress  Ears: TMs are skewed by cerumen bilaterally.  I attempted to remove cerumen with a curette and failed.  My medical assistant then completed bilateral ear wash.    Throat: no erythema, edema or exudate noted  Lymphatic: no anterior cervical lymph node enlargement  Cardiac: regular rate and rhythm, no murmurs  Pulmonary: lungs clear to auscultation bilaterally, no crackles, rales, rhonchi, or wheezing noted    Assessment and Plan:   1.  Bilateral cerumen impaction.  Cerumen was removed today.  I anticipate patient's hearing should return to normal within the next 24 hours.  If he has any ongoing concerns, he will let us know.    This dictation uses voice recognition software, which may contain typographical errors.

## 2021-06-23 NOTE — PROGRESS NOTES
Subjective: Patient comes in for evaluation he has had symptoms of cough congestion coughing up some yellowish phlegm no blood.    He has had minimal fever but has had a sore throat at times has had head congestion as well    He is a non-smoker    His son is been sick earlier and now he developed it.    He has had it for couple weeks to get better and now is worsened.    Tobacco status: He  reports that  has never smoked. he has never used smokeless tobacco.    Patient Active Problem List    Diagnosis Date Noted     Adenomatous colon polyp 05/22/2017       Current Outpatient Medications   Medication Sig Dispense Refill     azithromycin (ZITHROMAX Z-KATHYA) 250 MG tablet Take 2 tablets (500 mg) on  Day 1,  followed by 1 tablet (250 mg) once daily on Days 2 through 5. 6 tablet 0     No current facility-administered medications for this visit.        ROS:   10 point review of systems positive as outlined above otherwise negative    Objective:    /68 (Patient Site: Left Arm, Patient Position: Sitting, Cuff Size: Adult Regular)   Pulse 64   Temp 98.2  F (36.8  C) (Oral)   Resp 12   Wt 211 lb (95.7 kg)   BMI 27.46 kg/m    Body mass index is 27.46 kg/m .      General appearance no acute distress    HEENT oropharynx with erythema no exudate neck without adenopathy her graph is congested through the nose    TMs and canals normal    Lungs had some coarse breath sounds cleared with coughing    Heart was regular rate at 60.    No extremity edema    Assessment:  1. Bronchitis  azithromycin (ZITHROMAX Z-KATHYA) 250 MG tablet   2. Pharyngitis, unspecified etiology       Bronchitis we will treat with azithromycin push fluids get rest follow-up if not improved  Plan: Use Tylenol for discomfort use lozenges for sore throat.    This transcription uses voice recognition software, which may contain typographical errors.

## 2021-07-19 ENCOUNTER — NURSE TRIAGE (OUTPATIENT)
Dept: NURSING | Facility: CLINIC | Age: 56
End: 2021-07-19

## 2021-07-19 NOTE — TELEPHONE ENCOUNTER
Dry cough lasting a week. Irritating. Musician on the weekend.    Taking ibuprofen.    No other symptoms.    Warm transfer to [facility].    Allyson HOGUE [facility] Nurse Advisor    Reason for Disposition    Sinus congestion (pressure, fullness) present > 10 days    Additional Information    Negative: Severe difficulty breathing (e.g., struggling for each breath, speaks in single words)    Negative: Very weak (can't stand)    Negative: Sounds like a life-threatening emergency to the triager    Negative: Runny nose is caused by pollen or other allergies    Negative: Cough is the main symptom    Negative: Sore throat is the main symptom    Negative: Patient sounds very sick or weak to the triager    Negative: Fever > 103 F (39.4 C)    Negative: Fever > 101 F (38.3 C) and over 60 years of age    Negative: Fever > 100.0 F (37.8 C) and has diabetes mellitus or a weak immune system (e.g., HIV positive, cancer chemotherapy, organ transplant, splenectomy, chronic steroids)    Negative: Fever > 100.0 F (37.8 C) and bedridden (e.g., nursing home patient, stroke, chronic illness, recovering from surgery)    Negative: Fever present > 3 days (72 hours)    Negative: Fever returns after gone for over 24 hours and symptoms worse or not improved    Negative: Sinus pain (not just congestion) and fever    Negative: Earache    Protocols used: COMMON COLD-A-OH

## 2021-07-20 ENCOUNTER — OFFICE VISIT (OUTPATIENT)
Dept: FAMILY MEDICINE | Facility: CLINIC | Age: 56
End: 2021-07-20
Payer: COMMERCIAL

## 2021-07-20 VITALS
DIASTOLIC BLOOD PRESSURE: 73 MMHG | HEART RATE: 65 BPM | SYSTOLIC BLOOD PRESSURE: 107 MMHG | RESPIRATION RATE: 18 BRPM | TEMPERATURE: 97.6 F

## 2021-07-20 DIAGNOSIS — R05.9 COUGH: Primary | ICD-10-CM

## 2021-07-20 PROCEDURE — U0005 INFEC AGEN DETEC AMPLI PROBE: HCPCS | Performed by: FAMILY MEDICINE

## 2021-07-20 PROCEDURE — U0003 INFECTIOUS AGENT DETECTION BY NUCLEIC ACID (DNA OR RNA); SEVERE ACUTE RESPIRATORY SYNDROME CORONAVIRUS 2 (SARS-COV-2) (CORONAVIRUS DISEASE [COVID-19]), AMPLIFIED PROBE TECHNIQUE, MAKING USE OF HIGH THROUGHPUT TECHNOLOGIES AS DESCRIBED BY CMS-2020-01-R: HCPCS | Performed by: FAMILY MEDICINE

## 2021-07-20 PROCEDURE — 99213 OFFICE O/P EST LOW 20 MIN: CPT | Performed by: FAMILY MEDICINE

## 2021-07-20 NOTE — PROGRESS NOTES
Assessment/ Plan    1. Cough  Probably viral no no convincing upper respiratory symptoms.  Check Covid, otherwise recommend simple observation.  Very low risk for Covid since he has been vaccinated, works with vaccinated people so I think it is okay for him to attend work so long as he wears a mask indoors.  Follow-up if worsening/not improving over the next 1 to 2 weeks  - Symptomatic COVID-19 Virus (Coronavirus) by PCR Nasopharyngeal      Subjective  CC:  chief complaint  HPI:  Upper Respiratory infection  Duration 7days  Worst Symptoms: cough  Runny nose?  No  Loss of Taste/ smell?  No  Sore throat?  No  Cough/ productive or dry?  Yes: Mildly productive  Fever?  No  HA/ achiness?  No  Current symptoms similar to those at the onset of this illness?  Yes, though cough is in proving  Relevant exposure hx?  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 Enjoys bowling.     Patient Care Team:  Chidi Cortez MD as PCP - General (Family Practice)  Chidi Cortez MD as Assigned PCP  ROS  As above      Objective  Physical Exam  Vitals:    07/20/21 1652   BP: 107/73   BP Location: Left arm   Patient Position: Sitting   Cuff Size: Adult Large   Pulse: 65   Resp: 18   Temp: 97.6  F (36.4  C)   TempSrc: Temporal     Patient is alert, oriented and in no distress.    Conjunctiva, lids appear normal.  Nares are normal bilaterally.    TMs are visualized bilaterally and appear normal    There is no adenopathy in the neck.  Oral cavity is without any notable lesion,   oropharynx appears normal without any erythema, exudate, petechia    Chest appears normal,   auscultation reveals :  normal breath sounds,   no wheezing,  no rales   no  rhoannika.  Covid test done  Diagnostics  Pending    Please note: Voice recognition software was used in this dictation.  It may therefore contain typographical errors.

## 2021-07-21 LAB — SARS-COV-2 RNA RESP QL NAA+PROBE: NEGATIVE

## 2021-07-22 ENCOUNTER — TELEPHONE (OUTPATIENT)
Dept: FAMILY MEDICINE | Facility: CLINIC | Age: 56
End: 2021-07-22

## 2021-07-22 NOTE — TELEPHONE ENCOUNTER
----- Message from Yobani Srinivasan MD sent at 7/21/2021  6:33 PM CDT -----  Please let him know that his Covid test is negative

## 2021-08-15 ENCOUNTER — HEALTH MAINTENANCE LETTER (OUTPATIENT)
Age: 56
End: 2021-08-15

## 2021-10-11 ENCOUNTER — HEALTH MAINTENANCE LETTER (OUTPATIENT)
Age: 56
End: 2021-10-11

## 2022-02-02 ENCOUNTER — NURSE TRIAGE (OUTPATIENT)
Dept: NURSING | Facility: CLINIC | Age: 57
End: 2022-02-02

## 2022-02-02 ENCOUNTER — OFFICE VISIT (OUTPATIENT)
Dept: FAMILY MEDICINE | Facility: CLINIC | Age: 57
End: 2022-02-02
Payer: COMMERCIAL

## 2022-02-02 VITALS
WEIGHT: 204 LBS | HEART RATE: 59 BPM | DIASTOLIC BLOOD PRESSURE: 81 MMHG | BODY MASS INDEX: 26.19 KG/M2 | SYSTOLIC BLOOD PRESSURE: 147 MMHG | OXYGEN SATURATION: 99 % | RESPIRATION RATE: 16 BRPM

## 2022-02-02 DIAGNOSIS — S09.90XA HEAD TRAUMA, INITIAL ENCOUNTER: Primary | ICD-10-CM

## 2022-02-02 PROCEDURE — 99214 OFFICE O/P EST MOD 30 MIN: CPT | Performed by: FAMILY MEDICINE

## 2022-02-02 NOTE — PROGRESS NOTES
"Assessment & Plan    1. Head trauma, initial encounter  This is a 58 yo male who bumped the back of his head against a metal beam - at work.  This occurred yesterday.  He has mild symptoms of head trauma.  We discussed that it is reasonable to give his brain a \"rest\".  Will keep him out of work the remainder of this week.  Anticipate return to work on Monday February 7, 2022 , as scheduled.  Discussed signs/sx that should require urgent re-evaluation.  For now, reassured.        Return in about 1 week (around 2/9/2022) for if not getting better.    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 No pertinent past medical history.     No current outpatient medications on file.     No current facility-administered medications for this visit.        History reviewed. No pertinent surgical history.     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 Enjoys bowling.     Social Determinants of Health     Financial Resource Strain: Not on file   Food Insecurity: Not on file   Transportation Needs: Not on file   Physical Activity: Not on file   Stress: Not on file   Social Connections: Not on file   Intimate Partner Violence: Not on file   Housing Stability: Not on file        Family History   Problem Relation Age of Onset     Allergies Brother      Asthma Brother         Review of Systems   Constitutional: Negative for activity change, chills, fatigue and fever.   Gastrointestinal: Positive for nausea (minimal).   Neurological: Positive for headaches (mild). Negative for dizziness, weakness and light-headedness.   All other systems reviewed and are negative.       BP (!) 147/81 (BP Location: Left arm, Patient Position: Sitting, Cuff Size: Adult Regular)   Pulse 59   Resp 16   Wt 92.5 kg (204 lb)   SpO2 99%   BMI 26.19 kg/m       Physical Exam  Constitutional:       General: He is not in acute distress.     Appearance: He is well-developed.   HENT:      Right Ear: Tympanic membrane and external ear normal.      Left Ear: Tympanic membrane and external ear normal.      Nose: Nose normal.      Mouth/Throat:      Mouth: No oral lesions.      Pharynx: No oropharyngeal exudate.   Eyes:      General:         Right eye: No discharge.         Left eye: No discharge.      Conjunctiva/sclera: Conjunctivae normal.      Pupils: Pupils are equal, round, and reactive to light.   Neck:      Thyroid: No thyromegaly.      Trachea: No tracheal deviation.   Cardiovascular:      Rate and Rhythm: Normal rate and regular rhythm.      Pulses: Normal pulses.      Heart sounds: Normal heart sounds, S1 normal and S2 normal. No murmur heard.  No S3 or S4 sounds.     Pulmonary:      Effort: Pulmonary effort is normal. No respiratory distress.      Breath sounds: Normal breath sounds. No wheezing or rales.   Abdominal:      General: Bowel sounds are normal.      Palpations: Abdomen is soft. There is no mass.      Tenderness: There is no abdominal tenderness.   Musculoskeletal:         General: No deformity. Normal range of motion.      Cervical back: Neck supple.   Lymphadenopathy:      Cervical: No cervical adenopathy.   Skin:     General: Skin is warm and dry.      Findings: No lesion or rash.   Neurological:      General: No focal deficit present.      Mental Status: He is alert and oriented to person, place, and time.      Cranial Nerves: No cranial nerve deficit.      Sensory: No sensory deficit.      Motor: No weakness or abnormal muscle tone.      Coordination: Coordination normal.      Gait: Gait normal.      Deep Tendon Reflexes: Reflexes are normal and symmetric. Reflexes normal.   Psychiatric:         Speech: Speech normal.         Thought Content: Thought content normal.         Judgment: Judgment normal.          Results:  No results found for any visits on 02/02/22.    Medications at Conclusion of Visit:  No current outpatient medications on file.         IVANNA BOWERS MD

## 2022-02-02 NOTE — TELEPHONE ENCOUNTER
"Pt states \"I smacked my head pretty hard on a metal beam at work.\"  \"Back of head.\"  Occurred 24 hours ago.  \"Went to get a haircut after work.\"  \"Felt a little off.\"  \"Had just a touch of nausea.\"  \"Felt an 'egg' on back of scalp.\"  \"Had a touch of a headache last night.\"    \"Awoke today with a little headache.\"  \"Little bit of neck pain today.\"  Swelling in back of scalp \"has calmed down.\"    No vomiting.  No dizziness.  No black eyes.  \"Just little headache.\"    Pt would like clinical eval.  Transferred to a  for appointment.  Same-day OV scheduled.  Also discussed self-monitoring and family's monitoring over this 48-hour timeframe following head injury.   Pt verbalizes understanding.    [facility] Nurse Advisor     Reason for Disposition    Patient wants to be seen    Additional Information    Negative: ACUTE NEUROLOGIC SYMPTOM and symptom present now    Negative: Knocked out (unconscious) > 1 minute    Negative: Seizure (convulsion) occurred (Exception: prior history of seizures and now alert and without Acute Neurologic Symptoms)    Negative: Neck pain after dangerous injury (e.g., MVA, diving, trampoline, contact sports, fall > 10 feet or 3 meters) (Exception: neck pain began > 1 hour after injury)    Negative: Major bleeding (actively dripping or spurting) that can't be stopped    Negative: Penetrating head injury (e.g., knife, gunshot wound, metal object)    Negative: Sounds like a life-threatening emergency to the triager    Negative: Recently examined and diagnosed with a concussion by a healthcare provider and has questions about concussion symptoms    Negative: Can't remember what happened (amnesia)    Negative: Vomiting once or more    Negative: Watery or blood-tinged fluid dripping from the nose or ears    Negative: ACUTE NEUROLOGIC SYMPTOM and now fine    Negative: Knocked out (unconscious) < 1 minute and now fine    Negative: Severe headache    Negative: Dangerous injury (e.g., " MVA, diving, trampoline, contact sports, fall > 10 feet or 3 meters) or severe blow from hard object (e.g., golf club or baseball bat)    Negative: Large swelling or bruise > 2 inches (5 cm)    Negative: Skin is split open or gaping (length > 1/2 inch or 12 mm)    Negative: Bleeding won't stop after 10 minutes of direct pressure (using correct technique)    Negative: One or two 'black eyes' (bruising, purple color of eyelids), and onset within 24 hours of head injury    Negative: Taking Coumadin (warfarin) or other strong blood thinner, or known bleeding disorder (e.g., thrombocytopenia)    Negative: Age over 65 years with and area of head swelling or bruise    Negative: Sounds like a serious injury to the triager    Negative: Patient is confused or is an unreliable provider of information (e.g., dementia, severe intellectual disability, alcohol intoxication)    Commented on: No prior tetanus shots (or is not fully vaccinated) and any wound (e.g., cut or scrape)     No cut or scrape occurred.    Commented on: HIV positive or severe immunodeficiency (severely weak immune system) and DIRTY cut or scrape     No cut or scrape occurred.    Protocols used: HEAD INJURY-A-OH    _____________________    COVID 19 Nurse Triage Plan/Patient Instructions    Please be aware that novel coronavirus (COVID-19) may be circulating in the community. If you develop symptoms such as fever, cough, or SOB or if you have concerns about the presence of another infection including coronavirus (COVID-19), please contact your health care provider or visit [email or web address].     Disposition/Instructions    Additional COVID19 information to add for patients.   How can I protect others?  If you have symptoms (fever, cough, body aches or trouble breathing): Stay home and away from others (self-isolate) until:    At least 10 days have passed since your symptoms started, And     You ve had no fever--and no medicine that reduces fever--for  "1 full day (24 hours), And      Your other symptoms have resolved (gotten better).     If you don t have symptoms, but a test showed that you have COVID-19 (you tested positive):    Stay home and away from others (self-isolate). Follow the tips under \"How do I self-isolate?\" below for 10 days (20 days if you have a weak immune system).    You don't need to be retested for COVID-19 before going back to school or work. As long as you're fever-free and feeling better, you can go back to school, work and other activities after waiting the 10 or 20 days.     How do I self-isolate?    Stay in your own room, even for meals. Use your own bathroom if you can.     Stay away from others in your home. No hugging, kissing or shaking hands. No visitors.    Don t go to work, school or anywhere else.     Clean  high touch  surfaces often (doorknobs, counters, handles, etc.). Use a household cleaning spray or wipes. You ll find a full list on the EPA website:  www.epa.gov/pesticide-registration/list-n-disinfectants-use-against-sars-cov-2.    Cover your mouth and nose with a mask, tissue or washcloth to avoid spreading germs.    Wash your hands and face often. Use soap and water.    Caregivers in these groups are at risk for severe illness due to COVID-19:  o People 65 years and older  o People who live in a nursing home or long-term care facility  o People with chronic disease (lung, heart, cancer, diabetes, kidney, liver, immunologic)  o People who have a weakened immune system, including those who:  - Are in cancer treatment  - Take medicine that weakens the immune system, such as corticosteroids  - Had a bone marrow or organ transplant  - Have an immune deficiency  - Have poorly controlled HIV or AIDS  - Are obese (body mass index of 40 or higher)  - Smoke regularly    Caregivers should wear gloves while washing dishes, handling laundry and cleaning bedrooms and bathrooms.    Use caution when washing and drying laundry: Don t " shake dirty laundry, and use the warmest water setting that you can.    For more tips, go to www.cdc.gov/coronavirus/2019-ncov/downloads/10Things.pdf.    How can I take care of myself?  1. Get lots of rest. Drink extra fluids (unless a doctor has told you not to).     2. Take Tylenol (acetaminophen) for fever or pain. If you have liver or kidney problems, ask your family doctor if it s okay to take Tylenol.     Adults can take either:     650 mg (two 325 mg pills) every 4 to 6 hours, or     1,000 mg (two 500 mg pills) every 8 hours as needed.     Note: Don t take more than 3,000 mg in one day.   Acetaminophen is found in many medicines (both prescribed and over-the-counter medicines). Read all labels to be sure you don t take too much.     For children, check the Tylenol bottle for the right dose. The dose is based on the child s age or weight.    3. If you have other health problems (like cancer, heart failure, an organ transplant or severe kidney disease): Call your specialty clinic if you don t feel better in the next 2 days.    4. Know when to call 911: Emergency warning signs include:    Trouble breathing or shortness of breath    Pain or pressure in the chest that doesn t go away    Feeling confused like you haven t felt before, or not being able to wake up    Bluish-colored lips or face    What are the symptoms of COVID-19?     The most common symptoms are cough, fever and trouble breathing.     Less common symptoms include body aches, chills, diarrhea (loose, watery poops), fatigue (feeling very tired), headache, runny nose, sore throat and loss of smell.    COVID-19 can cause severe coughing (bronchitis) and lung infection (pneumonia).    How does it spread?     The virus may spread when a person coughs or sneezes into the air. The virus can travel about 6 feet this way, and it can live on surfaces.      Common  (household disinfectants) will kill the virus.    Who is at risk?  Anyone can catch  COVID-19 if they re around someone who has the virus.    How can others protect themselves?     Stay away from people who have COVID-19 (or symptoms of COVID-19).    Wash hands often with soap and water. Or, use hand  with at least 60% alcohol.    Avoid touching the eyes, nose or mouth.     Wear a face mask when you go out in public, when sick or when caring for a sick person.    Where can I get more information?    [facility]: About COVID-19: [email or web address]    CDC: What to Do If You re Sick: www.cdc.gov/coronavirus/2019-ncov/about/steps-when-sick.html    CDC: Ending Home Isolation: www.cdc.gov/coronavirus/2019-ncov/hcp/disposition-in-home-patients.html     CDC: Caring for Someone: www.cdc.gov/coronavirus/2019-ncov/if-you-are-sick/care-for-someone.html     [facility]: Interim Guidance for Hospital Discharge to Home: www.[facility].mn./diseases/coronavirus/hcp/hospdischarge.pdf    [facility] clinical trials (COVID-19 research studies): [email or web address]-clinical-trials     Below are the COVID-19 hotlines at the [place] [facility] ([facility]). Interpreters are available.   o For health questions: Call 592-499-3579 or 1-694.994.6506 (7 a.m. to 7 p.m.)  o For questions about schools and childcare: Call 375-443-1480 or 1-424.497.9807 (7 a.m. to 7 p.m.)          Thank you for taking steps to prevent the spread of this virus.  o Limit your contact with others.  o Wear a simple mask to cover your cough.  o Wash your hands well and often.    Resources    [facility]: About COVID-19: [email or web address]    CDC: What to Do If You're Sick: www.cdc.gov/coronavirus/2019-ncov/about/steps-when-sick.html    CDC: Ending Home Isolation: www.cdc.gov/coronavirus/2019-ncov/hcp/disposition-in-home-patients.html     CDC: Caring for Someone: www.cdc.gov/coronavirus/2019-ncov/if-you-are-sick/care-for-someone.html     [facility]: Interim Guidance for Hospital Discharge to  Home: www.health.[facility].[place]./diseases/coronavirus/hcp/hospdischarge.pdf    [facility] clinical trials (COVID-19 research studies): [email or web address]/umn-clinical-trials     Below are the COVID-19 hotlines at the [place] Department [facility] ([facility]). Interpreters are available.   o For health questions: Call 742-388-8990 or 1-821.137.9976 (7 a.m. to 7 p.m.)  o For questions about schools and childcare: Call 609-879-1966 or 1-563.302.9679 (7 a.m. to 7 p.m.)

## 2022-02-06 ASSESSMENT — ENCOUNTER SYMPTOMS
FEVER: 0
DIZZINESS: 0
ACTIVITY CHANGE: 0
WEAKNESS: 0
NAUSEA: 1
HEADACHES: 1
CHILLS: 0
LIGHT-HEADEDNESS: 0
FATIGUE: 0

## 2022-02-22 ENCOUNTER — OFFICE VISIT (OUTPATIENT)
Dept: FAMILY MEDICINE | Facility: CLINIC | Age: 57
End: 2022-02-22
Payer: COMMERCIAL

## 2022-02-22 ENCOUNTER — TELEPHONE (OUTPATIENT)
Dept: FAMILY MEDICINE | Facility: CLINIC | Age: 57
End: 2022-02-22

## 2022-02-22 VITALS
WEIGHT: 200 LBS | HEART RATE: 51 BPM | HEIGHT: 73 IN | DIASTOLIC BLOOD PRESSURE: 82 MMHG | RESPIRATION RATE: 20 BRPM | BODY MASS INDEX: 26.51 KG/M2 | SYSTOLIC BLOOD PRESSURE: 136 MMHG

## 2022-02-22 DIAGNOSIS — R68.82 DECREASED LIBIDO: ICD-10-CM

## 2022-02-22 DIAGNOSIS — Z11.59 NEED FOR HEPATITIS C SCREENING TEST: ICD-10-CM

## 2022-02-22 DIAGNOSIS — Z00.00 HEALTH CARE MAINTENANCE: ICD-10-CM

## 2022-02-22 DIAGNOSIS — S00.93XS CONTUSION OF HEAD, UNSPECIFIED PART OF HEAD, SEQUELA: ICD-10-CM

## 2022-02-22 DIAGNOSIS — Z11.4 SCREENING FOR HIV (HUMAN IMMUNODEFICIENCY VIRUS): ICD-10-CM

## 2022-02-22 DIAGNOSIS — N52.9 ERECTILE DYSFUNCTION, UNSPECIFIED ERECTILE DYSFUNCTION TYPE: Primary | ICD-10-CM

## 2022-02-22 LAB — HIV 1+2 AB+HIV1 P24 AG SERPL QL IA: NEGATIVE

## 2022-02-22 PROCEDURE — 36415 COLL VENOUS BLD VENIPUNCTURE: CPT | Performed by: FAMILY MEDICINE

## 2022-02-22 PROCEDURE — 0054A COVID-19,PF,PFIZER (12+ YRS): CPT | Performed by: FAMILY MEDICINE

## 2022-02-22 PROCEDURE — 91305 COVID-19,PF,PFIZER (12+ YRS): CPT | Performed by: FAMILY MEDICINE

## 2022-02-22 PROCEDURE — 86803 HEPATITIS C AB TEST: CPT | Performed by: FAMILY MEDICINE

## 2022-02-22 PROCEDURE — 99214 OFFICE O/P EST MOD 30 MIN: CPT | Performed by: FAMILY MEDICINE

## 2022-02-22 PROCEDURE — 84270 ASSAY OF SEX HORMONE GLOBUL: CPT | Performed by: FAMILY MEDICINE

## 2022-02-22 PROCEDURE — 84403 ASSAY OF TOTAL TESTOSTERONE: CPT | Performed by: FAMILY MEDICINE

## 2022-02-22 PROCEDURE — 87389 HIV-1 AG W/HIV-1&-2 AB AG IA: CPT | Performed by: FAMILY MEDICINE

## 2022-02-22 RX ORDER — SILDENAFIL CITRATE 20 MG/1
TABLET ORAL
Qty: 30 TABLET | Refills: 3 | Status: SHIPPED | OUTPATIENT
Start: 2022-02-22

## 2022-02-22 NOTE — TELEPHONE ENCOUNTER
Please contact the pharmacy.  The patient knows that this is not covered under his insurance.  He wants to pay cash for the prescription

## 2022-02-22 NOTE — CONFIDENTIAL NOTE
The pharmacy sent over a message on the pt's Sildenafil stating that it needs to be sent to either a [facility] or [facility].

## 2022-02-23 LAB
HCV AB SERPL QL IA: NONREACTIVE
SHBG SERPL-SCNC: 37 NMOL/L (ref 11–80)

## 2022-02-24 LAB
TESTOST FREE SERPL-MCNC: 9.05 NG/DL
TESTOST SERPL-MCNC: 467 NG/DL (ref 240–950)

## 2022-05-25 ENCOUNTER — OFFICE VISIT (OUTPATIENT)
Dept: FAMILY MEDICINE | Facility: CLINIC | Age: 57
End: 2022-05-25
Payer: COMMERCIAL

## 2022-05-25 VITALS
HEART RATE: 53 BPM | RESPIRATION RATE: 18 BRPM | BODY MASS INDEX: 25.03 KG/M2 | WEIGHT: 191 LBS | DIASTOLIC BLOOD PRESSURE: 76 MMHG | SYSTOLIC BLOOD PRESSURE: 129 MMHG

## 2022-05-25 DIAGNOSIS — Z83.3 FH: DIABETES MELLITUS: ICD-10-CM

## 2022-05-25 DIAGNOSIS — N52.9 ERECTILE DYSFUNCTION, UNSPECIFIED ERECTILE DYSFUNCTION TYPE: ICD-10-CM

## 2022-05-25 DIAGNOSIS — Z00.00 ROUTINE HISTORY AND PHYSICAL EXAMINATION OF ADULT: Primary | ICD-10-CM

## 2022-05-25 DIAGNOSIS — Z12.11 SCREEN FOR COLON CANCER: ICD-10-CM

## 2022-05-25 DIAGNOSIS — D12.6 ADENOMATOUS POLYP OF COLON, UNSPECIFIED PART OF COLON: ICD-10-CM

## 2022-05-25 DIAGNOSIS — Z13.220 SCREENING FOR HYPERLIPIDEMIA: ICD-10-CM

## 2022-05-25 DIAGNOSIS — R19.8 ABDOMINAL FULLNESS: ICD-10-CM

## 2022-05-25 LAB
ALBUMIN SERPL-MCNC: 3.9 G/DL (ref 3.5–5)
ALBUMIN UR-MCNC: NEGATIVE MG/DL
ALP SERPL-CCNC: 69 U/L (ref 45–120)
ALT SERPL W P-5'-P-CCNC: 16 U/L (ref 0–45)
ANION GAP SERPL CALCULATED.3IONS-SCNC: 10 MMOL/L (ref 5–18)
APPEARANCE UR: CLEAR
AST SERPL W P-5'-P-CCNC: 17 U/L (ref 0–40)
BACTERIA #/AREA URNS HPF: ABNORMAL /HPF
BILIRUB SERPL-MCNC: 0.5 MG/DL (ref 0–1)
BILIRUB UR QL STRIP: NEGATIVE
BUN SERPL-MCNC: 17 MG/DL (ref 8–22)
CALCIUM SERPL-MCNC: 9.4 MG/DL (ref 8.5–10.5)
CHLORIDE BLD-SCNC: 103 MMOL/L (ref 98–107)
CHOLEST SERPL-MCNC: 150 MG/DL
CO2 SERPL-SCNC: 27 MMOL/L (ref 22–31)
COLOR UR AUTO: YELLOW
CREAT SERPL-MCNC: 0.71 MG/DL (ref 0.7–1.3)
ERYTHROCYTE [DISTWIDTH] IN BLOOD BY AUTOMATED COUNT: 13.3 % (ref 10–15)
FASTING STATUS PATIENT QL REPORTED: YES
GFR SERPL CREATININE-BSD FRML MDRD: >90 ML/MIN/1.73M2
GLUCOSE BLD-MCNC: 96 MG/DL (ref 70–125)
GLUCOSE UR STRIP-MCNC: NEGATIVE MG/DL
HCT VFR BLD AUTO: 41.8 % (ref 40–53)
HDLC SERPL-MCNC: 47 MG/DL
HGB BLD-MCNC: 13.9 G/DL (ref 13.3–17.7)
HGB UR QL STRIP: NEGATIVE
KETONES UR STRIP-MCNC: NEGATIVE MG/DL
LDLC SERPL CALC-MCNC: 92 MG/DL
LEUKOCYTE ESTERASE UR QL STRIP: NEGATIVE
LIPASE SERPL-CCNC: 33 U/L (ref 0–52)
MCH RBC QN AUTO: 29.7 PG (ref 26.5–33)
MCHC RBC AUTO-ENTMCNC: 33.3 G/DL (ref 31.5–36.5)
MCV RBC AUTO: 89 FL (ref 78–100)
NITRATE UR QL: NEGATIVE
PH UR STRIP: 6.5 [PH] (ref 5–8)
PLATELET # BLD AUTO: 209 10E3/UL (ref 150–450)
POTASSIUM BLD-SCNC: 4.2 MMOL/L (ref 3.5–5)
PROT SERPL-MCNC: 7.2 G/DL (ref 6–8)
PSA SERPL-MCNC: 0.38 UG/L (ref 0–3.5)
RBC # BLD AUTO: 4.68 10E6/UL (ref 4.4–5.9)
RBC #/AREA URNS AUTO: ABNORMAL /HPF
SODIUM SERPL-SCNC: 140 MMOL/L (ref 136–145)
SP GR UR STRIP: 1.01 (ref 1–1.03)
SQUAMOUS #/AREA URNS AUTO: ABNORMAL /LPF
TRIGL SERPL-MCNC: 53 MG/DL
TSH SERPL DL<=0.005 MIU/L-ACNC: 2.1 UIU/ML (ref 0.3–5)
UROBILINOGEN UR STRIP-ACNC: 0.2 E.U./DL
WBC # BLD AUTO: 5.7 10E3/UL (ref 4–11)
WBC #/AREA URNS AUTO: ABNORMAL /HPF

## 2022-05-25 PROCEDURE — 99396 PREV VISIT EST AGE 40-64: CPT | Performed by: FAMILY MEDICINE

## 2022-05-25 PROCEDURE — 81001 URINALYSIS AUTO W/SCOPE: CPT | Performed by: FAMILY MEDICINE

## 2022-05-25 PROCEDURE — 85027 COMPLETE CBC AUTOMATED: CPT | Performed by: FAMILY MEDICINE

## 2022-05-25 PROCEDURE — 84153 ASSAY OF PSA TOTAL: CPT | Performed by: FAMILY MEDICINE

## 2022-05-25 PROCEDURE — 84443 ASSAY THYROID STIM HORMONE: CPT | Performed by: FAMILY MEDICINE

## 2022-05-25 PROCEDURE — 80053 COMPREHEN METABOLIC PANEL: CPT | Performed by: FAMILY MEDICINE

## 2022-05-25 PROCEDURE — 36415 COLL VENOUS BLD VENIPUNCTURE: CPT | Performed by: FAMILY MEDICINE

## 2022-05-25 PROCEDURE — 83690 ASSAY OF LIPASE: CPT | Performed by: FAMILY MEDICINE

## 2022-05-25 PROCEDURE — 80061 LIPID PANEL: CPT | Performed by: FAMILY MEDICINE

## 2022-05-25 ASSESSMENT — ENCOUNTER SYMPTOMS
SHORTNESS OF BREATH: 0
PALPITATIONS: 0
CHILLS: 0
FEVER: 0
PARESTHESIAS: 0
HEMATURIA: 0
MYALGIAS: 0
EYE PAIN: 0
ABDOMINAL PAIN: 0
JOINT SWELLING: 0
HEADACHES: 0
DIARRHEA: 0
WEAKNESS: 0
FREQUENCY: 0
NERVOUS/ANXIOUS: 0
HEMATOCHEZIA: 0
DYSURIA: 0
CONSTIPATION: 0
COUGH: 0
DIZZINESS: 0
ARTHRALGIAS: 0
HEARTBURN: 0
SORE THROAT: 0
NAUSEA: 0

## 2022-05-25 NOTE — PROGRESS NOTES
Assessment & Plan        ICD-10-CM    1. Routine history and physical examination of adult  Z00.00 Comprehensive metabolic panel (BMP + Alb, Alk Phos, ALT, AST, Total. Bili, TP)     TSH     UA with Microscopic reflex to Culture - Clinic Collect     PSA, tumor marker     Urine Microscopic   2. Screening for hyperlipidemia  Z13.220 Lipid Profile (Chol, Trig, HDL, LDL calc)   3. Screen for colon cancer  Z12.11 Adult Gastro Ref - Procedure Only   4. Adenomatous polyp of colon, unspecified part of colon  D12.6 CBC with platelets   5. FH: diabetes mellitus  Z83.3     Father   6. Erectile dysfunction, unspecified erectile dysfunction type  N52.9    7. Abdominal fullness  R19.8 Lipase        Physical stable    Screening lipid panel    Colonoscopy follow-up for adenomatous colon polyp.    Family history for diabetes in father we will check fasting blood sugar    Erectile dysfunction seems more situational.  Does not feel he needs the sildenafil    Previous history of pancreatitis, presently without symptoms not vomiting normal bowel movements, we will check a lipase.  Previous abdominal fullness resolved with diet and exercise.    Patient be contacted with lab results and discuss follow-up.    I did encourage him to check his insurance regarding shingles coverage, [employer].    Return in about 1 year (around 5/25/2023) for Routine preventive. for recheck.        Subjective:    This 57 year old male was seen today for a physical.    Patient is up-to-date on his COVID vaccination    Back in February had labs done with negative HIV hepatitis C testosterone was okay.    Is no longer using the sildenafil.    Due for other labs today including CMP lipid CBC TSH UA and PSA    He has had pancreatitis in the past he did have some fullness in through his abdomen a few weeks ago that is now resolved.  I did check a lipase level as well    He has been watching diet better he is down about 15 pounds from February he is down to 191 pounds  he was 204 then he feels healthier he has been working out with a .    Also does physical work, and plays in a band, [employer].    Patient had adenomatous colon polyp, tubular adenoma in May 2017, due for colonoscopy now.  I put in referral to [facility].    No additional concern or issue.        Review of Systems    10 point review of systems positive as outlined above otherwise negative    Family history for diabetes in his father    There is been lung cancer in brother and mother they are both heavy smokers.    Current Outpatient Medications   Medication     sildenafil (REVATIO) 20 MG tablet     No current facility-administered medications for this visit.       Objective    Vitals: /76 (BP Location: Left arm, Patient Position: Sitting, Cuff Size: Adult Regular)   Pulse 53   Resp 18   Wt 86.6 kg (191 lb)   BMI 25.03 kg/m    BMI= Body mass index is 25.03 kg/m .     Physical Exam    General appearance no acute distress    Vital signs were stable.    HEENT unremarkable no nasal drainage canals and TMs normal oropharynx clear nontender neck no adenopathy no thyroid fullness    No bruit    Lungs clear no rales or rhonchi    Heart was regular rate 55-60.    No murmur    Abdomen soft nontender no fullness no axillary inguinal adenopathy    Genitalia normal descended testes no evidence of hernia    Rectal was done prostate smooth not enlarged no nodules or firmness.    PSA level pending    Extremities without edema pulses full    Skin is normal no worrisome rashes    Labs CMP lipid CBC TSH lipase urine and PSA pending        Chidi Cortez MD   Answers for HPI/ROS submitted by the patient on 5/25/2022  Frequency of exercise:: 2-3 days/week  Getting at least 3 servings of Calcium per day:: NO  Diet:: Regular (no restrictions)  Taking medications regularly:: Yes  Medication side effects:: Not applicable  Bi-annual eye exam:: NO  Dental care twice a year:: Yes  Sleep apnea or symptoms of sleep apnea::  None  abdominal pain: No  Blood in stool: No  Blood in urine: No  chest pain: No  chills: No  congestion: No  constipation: No  cough: No  diarrhea: No  dizziness: No  ear pain: No  eye pain: No  nervous/anxious: No  fever: No  frequency: No  genital sores: No  headaches: No  hearing loss: No  heartburn: No  arthralgias: No  joint swelling: No  peripheral edema: No  mood changes: No  myalgias: No  nausea: No  dysuria: No  palpitations: No  Skin sensation changes: No  sore throat: No  urgency: No  rash: No  shortness of breath: No  visual disturbance: No  weakness: No  impotence: No  penile discharge: No  Additional concerns today:: No  Duration of exercise:: Greater than 60 minutes

## 2022-07-28 ENCOUNTER — NURSE TRIAGE (OUTPATIENT)
Dept: NURSING | Facility: CLINIC | Age: 57
End: 2022-07-28

## 2022-07-28 NOTE — TELEPHONE ENCOUNTER
Patient has a nosebleed going on since 7:30 am.  Blew nose and started bleeding.  Patient has been applying pressure correctly.  Patient denies being too weak to stand. Patient denies fever cough and shortness of breath.        Reason for Disposition    Bleeding present > 30 minutes and using correct method of direct pressure    Additional Information    Negative: Fainted (passed out), or too weak to stand following large blood loss    Negative: Sounds like a life-threatening emergency to the triager    Protocols used: NOSEBLEED-A-OH

## 2022-08-08 ENCOUNTER — TRANSFERRED RECORDS (OUTPATIENT)
Dept: HEALTH INFORMATION MANAGEMENT | Facility: CLINIC | Age: 57
End: 2022-08-08

## 2022-09-24 ENCOUNTER — HEALTH MAINTENANCE LETTER (OUTPATIENT)
Age: 57
End: 2022-09-24

## 2023-08-05 ENCOUNTER — HEALTH MAINTENANCE LETTER (OUTPATIENT)
Age: 58
End: 2023-08-05

## 2023-09-26 ENCOUNTER — NURSE TRIAGE (OUTPATIENT)
Dept: FAMILY MEDICINE | Facility: CLINIC | Age: 58
End: 2023-09-26

## 2023-09-26 ENCOUNTER — VIRTUAL VISIT (OUTPATIENT)
Dept: FAMILY MEDICINE | Facility: CLINIC | Age: 58
End: 2023-09-26
Payer: COMMERCIAL

## 2023-09-26 DIAGNOSIS — M54.50 ACUTE BILATERAL LOW BACK PAIN WITHOUT SCIATICA: Primary | ICD-10-CM

## 2023-09-26 PROCEDURE — 99213 OFFICE O/P EST LOW 20 MIN: CPT | Mod: VID | Performed by: PHYSICIAN ASSISTANT

## 2023-09-26 ASSESSMENT — ENCOUNTER SYMPTOMS: BACK PAIN: 1

## 2023-09-26 NOTE — LETTER
2023      Evangelist Singh  [address] [place] [address]        To Whom It May Concern:    Evangelist Singh was seen in our clinic. He may return to work 2023 with the followinlb lifting restriction and no bending at the waist until 10/2/2023.      Sincerely,        Calli Pro PA-C

## 2023-09-26 NOTE — PROGRESS NOTES
Evangelist is a 58 year old who is being evaluated via a billable video visit.      How would you like to obtain your AVS? [employer]  If the video visit is dropped, the invitation should be resent by: Text to cell phone: [phone]  Will anyone else be joining your video visit? No          Assessment & Plan     Acute bilateral low back pain without sciatica  -recommend ibuprofen 600mg q6h prn pain  -discussed ice, heat, gentle ROM exercises  -info sent via [facility]  -discussed alarm signs and symptoms to monitor for and discussed when to be reevaluated in the UC or ED   -follow-up prn      Calli Pro PA-C  [facility]   Evangelist is a 58 year old, presenting for the following health issues:  Back Pain (Lower back pain for couple weeks and can't hardly walk. Tried taking ibuprofen and it helps a little bit. )        9/26/2023    11:15 AM   Additional Questions   Roomed by hser   Accompanied by self       -h/o low back pain in the past  -recently, a few weeks ago, felt like he tweaked the back, pain improved  -then pain increased yesterday after sitting  -pain was severe overnight, felt a sharp pain and ended up falling  -has been taking ibuprofen 400mg for his pain which was helpful    -left low back pain is across the back also has some left shin and foot sensation that is not pain but feels numb  -    History of Present Illness       Back Pain:  He presents for follow up of back pain. Patient's back pain is a new problem.    Original cause of back pain: turning/bending  First noticed back pain: 1-4 weeks ago  Patient feels back pain: constantlyLocation of back pain:  Right lower back and left lower back  Description of back pain: stabbing  Back pain spreads: left foot    Since patient first noticed back pain, pain is: rapidly worsening  Does back pain interfere with his job:  Yes  On a scale of 1-10 (10 being the worst), patient describes pain as:  10  What makes back pain  worse: bending, coughing, certain positions, lying down, sitting, standing and twisting   Acupuncture: not tried  Acetaminophen: helpful  Activity or exercise: not tried  Chiropractor:  Not tried  Cold: not tried  Heat: not tried  Massage: not tried  Muscle relaxants: not tried  NSAIDS: helpful  Opioids: not tried  Physical Therapy: not tried  Rest: not helpful  Steroid Injection: not tried  Stretching: not tried  Surgery: not tried  TENS unit: not tried  Topical pain relievers: not tried  Other healthcare providers patient is seeing for back pain: None    He eats 2-3 servings of fruits and vegetables daily.He consumes 0 sweetened beverage(s) daily.He exercises with enough effort to increase his heart rate 30 to 60 minutes per day.  He exercises with enough effort to increase his heart rate 3 or less days per week.   He is taking medications regularly.     Review of Systems   Musculoskeletal:  Positive for back pain.      Constitutional, HEENT, cardiovascular, pulmonary, gi and gu systems are negative, except as otherwise noted.      Objective    Vitals - Patient Reported  Systolic (Patient Reported):  (unable to)  Diastolic (Patient Reported):  (unable to)  Weight (Patient Reported):  (unable to)  Height (Patient Reported):  (unable to)  SpO2 (Patient Reported):  (unable to)  Temperature (Patient Reported): 98.1  F (36.7  C)  Pulse (Patient Reported):  (unable to)      Vitals:  No vitals were obtained today due to virtual visit.    Physical Exam   GENERAL: Healthy, alert and no distress  EYES: Eyes grossly normal to inspection.  No discharge or erythema, or obvious scleral/conjunctival abnormalities.  RESP: No audible wheeze, cough, or visible cyanosis.  No visible retractions or increased work of breathing.    SKIN: Visible skin clear. No significant rash, abnormal pigmentation or lesions.  NEURO: Cranial nerves grossly intact.  Mentation and speech appropriate for age.  PSYCH: Mentation appears normal, affect  normal/bright, judgement and insight intact, normal speech and appearance well-groomed.          Video-Visit Details    Type of service:  Video Visit     Originating Location (pt. Location): Home    Distant Location (provider location):  Off-site  Platform used for Video Visit: ZIPDIGS    Prior to immunization administration, verified patients identity using patient s name and date of birth. Please see Immunization Activity for additional information.     Screening Questionnaire for Adult Immunization    Are you sick today?   No   Do you have allergies to medications, food, a vaccine component or latex?   No   Have you ever had a serious reaction after receiving a vaccination?   No   Do you have a long-term health problem with heart, lung, kidney, or metabolic disease (e.g., diabetes), asthma, a blood disorder, no spleen, complement component deficiency, a cochlear implant, or a spinal fluid leak?  Are you on long-term aspirin therapy?   No   Do you have cancer, leukemia, HIV/AIDS, or any other immune system problem?   No   Do you have a parent, brother, or sister with an immune system problem?   No   In the past 3 months, have you taken medications that affect  your immune system, such as prednisone, other steroids, or anticancer drugs; drugs for the treatment of rheumatoid arthritis, Crohn s disease, or psoriasis; or have you had radiation treatments?   No   Have you had a seizure, or a brain or other nervous system problem?   No   During the past year, have you received a transfusion of blood or blood    products, or been given immune (gamma) globulin or antiviral drug?   No   For women: Are you pregnant or is there a chance you could become       pregnant during the next month?   No   Have you received any vaccinations in the past 4 weeks?   No     Immunization questionnaire answers were all negative.      Patient instructed to remain in clinic for 15 minutes afterwards, and to report any adverse reactions.      Screening performed by Colt Jimenez MA on 9/26/2023 at 11:18 AM.

## 2023-09-26 NOTE — TELEPHONE ENCOUNTER
Nurse Triage SBAR    Is this a 2nd Level Triage? NO    Situation: Pt report insidious onset of rt low back pain approximately 1 and 1/2 weeks ago. Pain suddenly exacerbated last evening through the night. Rate pain at 9-10 with movement or ambulation and 7 when still. Also reports some tingling sensation (funny feeling) in left leg down to calf and shin.    Background: Pt report being dx with pancreatitis approx 10 years ago, had some back pain but abd pain was more severe. No abd pain with this episode however.    Assessment: Severe rt side low back pain. Walking or movement increase pain. No fever, no urinary or bowel incontinence. No nausea / vomiting, no tenderness or soreness in upper belly, but abdomen feels mildly bloated.      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     Protocol Recommended Disposition:   See in Office Today    Recommendation: NA. Pt scheduled for virtual visit with PA today.      NA.    Does the patient meet one of the following criteria for ADS visit consideration? No           Reason for Disposition   SEVERE back pain (e.g., excruciating, unable to do any normal activities) and not improved after pain medicine and CARE ADVICE    Additional Information   Negative: Passed out (i.e., fainted, collapsed and was not responding)   Negative: Shock suspected (e.g., cold/pale/clammy skin, too weak to stand, low BP, rapid pulse)   Negative: Sounds like a life-threatening emergency to the triager   Negative: Major injury to the back (e.g., MVA, fall > 10 feet or 3 meters, penetrating injury, etc.)   Negative: Pain in the upper back over the ribs (rib cage) that radiates (travels) into the chest   Negative: Pain in the upper back over the ribs (rib cage) and worsened by coughing (or clearly increases with breathing)   Negative: Back pain during pregnancy   Negative: SEVERE back pain of sudden  "onset and age > 60 years   Negative: SEVERE abdominal pain (e.g., excruciating)   Negative: Abdominal pain and age > 60 years   Negative: Unable to urinate (or only a few drops) and bladder feels very full   Negative: Loss of bladder or bowel control (urine or bowel incontinence; wetting self, leaking stool) of new-onset   Negative: Numbness (loss of sensation) in groin or rectal area   Negative: Pain radiates into groin, scrotum   Negative: Blood in urine (red, pink, or tea-colored)   Negative: Vomiting and pain over lower ribs of back (i.e., flank - kidney area)   Negative: Weakness of a leg or foot (e.g., unable to bear weight, dragging foot)   Negative: Patient sounds very sick or weak to the triager   Negative: Fever > 100.4 F (38.0 C) and flank pain   Negative: Pain or burning with passing urine (urination)    Answer Assessment - Initial Assessment Questions  1. ONSET: \"When did the pain begin?\"       Back pain started a couple weeks ago, but the pain escalated yesterday through the night  2. LOCATION: \"Where does it hurt?\" (upper, mid or lower back)      Rt lower back,   3. SEVERITY: \"How bad is the pain?\"  (e.g., Scale 1-10; mild, moderate, or severe)    - MILD (1-3): Doesn't interfere with normal activities.     - MODERATE (4-7): Interferes with normal activities or awakens from sleep.     - SEVERE (8-10): Excruciating pain, unable to do any normal activities.       7/ 10. Walking increased pain to a 9 or 10, can't barley walk    4. PATTERN: \"Is the pain constant?\" (e.g., yes, no; constant, intermittent)       Constant right now  5. RADIATION: \"Does the pain shoot into your legs or somewhere else?\"      I feel some archness into my left calf and shin  6. CAUSE:  \"What do you think is causing the back pain?\"       Unsure, pain suddenly came on,  but when I was diagnosed with pancreatitis 10 yrs ago, I have similar pain.  7. BACK OVERUSE:  \"Any recent lifting of heavy objects, strenuous work or exercise?\"     " " Using fork lifts and lifting pallets, just standing and lifting equipments approximately weighing 10 to 55 lbs    8. MEDICINES: \"What have you taken so far for the pain?\" (e.g., nothing, acetaminophen, NSAIDS)      Advil and Ibuprofen, some what helpful    9. NEUROLOGIC SYMPTOMS: \"Do you have any weakness, numbness, or problems with bowel/bladder control?\"      None    10. OTHER SYMPTOMS: \"Do you have any other symptoms?\" (e.g., fever, abdomen pain, burning with urination, blood in urine)        None    11. PREGNANCY: \"Is there any chance you are pregnant?\" \"When was your last menstrual period?\"        No    Protocols used: Back Pain-A-[place]    "

## 2023-11-06 ENCOUNTER — PATIENT OUTREACH (OUTPATIENT)
Dept: GASTROENTEROLOGY | Facility: CLINIC | Age: 58
End: 2023-11-06
Payer: COMMERCIAL

## 2024-03-20 ENCOUNTER — OFFICE VISIT (OUTPATIENT)
Dept: FAMILY MEDICINE | Facility: CLINIC | Age: 59
End: 2024-03-20
Payer: COMMERCIAL

## 2024-03-20 VITALS
RESPIRATION RATE: 16 BRPM | SYSTOLIC BLOOD PRESSURE: 128 MMHG | BODY MASS INDEX: 26.56 KG/M2 | DIASTOLIC BLOOD PRESSURE: 74 MMHG | TEMPERATURE: 97.4 F | HEART RATE: 72 BPM | OXYGEN SATURATION: 99 % | WEIGHT: 207 LBS | HEIGHT: 74 IN

## 2024-03-20 DIAGNOSIS — Z11.3 ROUTINE SCREENING FOR STI (SEXUALLY TRANSMITTED INFECTION): ICD-10-CM

## 2024-03-20 DIAGNOSIS — Z00.00 ROUTINE GENERAL MEDICAL EXAMINATION AT A HEALTH CARE FACILITY: Primary | ICD-10-CM

## 2024-03-20 LAB
ERYTHROCYTE [DISTWIDTH] IN BLOOD BY AUTOMATED COUNT: 13 % (ref 10–15)
HBA1C MFR BLD: 5.5 % (ref 0–5.6)
HCT VFR BLD AUTO: 42.4 % (ref 40–53)
HGB BLD-MCNC: 14.1 G/DL (ref 13.3–17.7)
MCH RBC QN AUTO: 29.2 PG (ref 26.5–33)
MCHC RBC AUTO-ENTMCNC: 33.3 G/DL (ref 31.5–36.5)
MCV RBC AUTO: 88 FL (ref 78–100)
PLATELET # BLD AUTO: 213 10E3/UL (ref 150–450)
RBC # BLD AUTO: 4.83 10E6/UL (ref 4.4–5.9)
T PALLIDUM AB SER QL: NONREACTIVE
WBC # BLD AUTO: 6.3 10E3/UL (ref 4–11)

## 2024-03-20 PROCEDURE — G0103 PSA SCREENING: HCPCS | Performed by: FAMILY MEDICINE

## 2024-03-20 PROCEDURE — 99396 PREV VISIT EST AGE 40-64: CPT | Performed by: FAMILY MEDICINE

## 2024-03-20 PROCEDURE — 36415 COLL VENOUS BLD VENIPUNCTURE: CPT | Performed by: FAMILY MEDICINE

## 2024-03-20 PROCEDURE — 84443 ASSAY THYROID STIM HORMONE: CPT | Performed by: FAMILY MEDICINE

## 2024-03-20 PROCEDURE — 80048 BASIC METABOLIC PNL TOTAL CA: CPT | Performed by: FAMILY MEDICINE

## 2024-03-20 PROCEDURE — 86780 TREPONEMA PALLIDUM: CPT | Performed by: FAMILY MEDICINE

## 2024-03-20 PROCEDURE — 87389 HIV-1 AG W/HIV-1&-2 AB AG IA: CPT | Performed by: FAMILY MEDICINE

## 2024-03-20 PROCEDURE — 82607 VITAMIN B-12: CPT | Performed by: FAMILY MEDICINE

## 2024-03-20 PROCEDURE — 85027 COMPLETE CBC AUTOMATED: CPT | Performed by: FAMILY MEDICINE

## 2024-03-20 PROCEDURE — 87491 CHLMYD TRACH DNA AMP PROBE: CPT | Performed by: FAMILY MEDICINE

## 2024-03-20 PROCEDURE — 83036 HEMOGLOBIN GLYCOSYLATED A1C: CPT | Performed by: FAMILY MEDICINE

## 2024-03-20 PROCEDURE — 87591 N.GONORRHOEAE DNA AMP PROB: CPT | Performed by: FAMILY MEDICINE

## 2024-03-20 SDOH — HEALTH STABILITY: PHYSICAL HEALTH: ON AVERAGE, HOW MANY DAYS PER WEEK DO YOU ENGAGE IN MODERATE TO STRENUOUS EXERCISE (LIKE A BRISK WALK)?: 4 DAYS

## 2024-03-20 SDOH — HEALTH STABILITY: PHYSICAL HEALTH: ON AVERAGE, HOW MANY MINUTES DO YOU ENGAGE IN EXERCISE AT THIS LEVEL?: 150+ MIN

## 2024-03-20 ASSESSMENT — SOCIAL DETERMINANTS OF HEALTH (SDOH): HOW OFTEN DO YOU GET TOGETHER WITH FRIENDS OR RELATIVES?: ONCE A WEEK

## 2024-03-20 NOTE — PATIENT INSTRUCTIONS
Preventive Care Advice   This is general advice given by our system to help you stay healthy. However, your care team may have specific advice just for you. Please talk to your care team about your preventive care needs.  Nutrition  Eat 5 or more servings of fruits and vegetables each day.  Try wheat bread, brown rice and whole grain pasta (instead of white bread, rice, and pasta).  Get enough calcium and vitamin D. Check the label on foods and aim for 100% of the RDA (recommended daily allowance).  Lifestyle  Exercise at least 150 minutes each week   (30 minutes a day, 5 days a week).  Do muscle strengthening activities 2 days a week. These help control your weight and prevent disease.  No smoking.  Wear sunscreen to prevent skin cancer.  Have a dental exam and cleaning every 6 months.  Yearly exams  See your health care team every year to talk about:  Any changes in your health.  Any medicines your care team has prescribed.  Preventive care, family planning, and ways to prevent chronic diseases.  Shots (vaccines)   HPV shots (up to age 26), if you've never had them before.  Hepatitis B shots (up to age 59), if you've never had them before.  COVID-19 shot: Get this shot when it's due.  Flu shot: Get a flu shot every year.  Tetanus shot: Get a tetanus shot every 10 years.  Pneumococcal, hepatitis A, and RSV shots: Ask your care team if you need these based on your risk.  Shingles shot (for age 50 and up).  General health tests  Diabetes screening:  Starting at age 35, Get screened for diabetes at least every 3 years.  If you are younger than age 35, ask your care team if you should be screened for diabetes.  Cholesterol test: At age 39, start having a cholesterol test every 5 years, or more often if advised.  Bone density scan (DEXA): At age 50, ask your care team if you should have this scan for osteoporosis (brittle bones).  Hepatitis C: Get tested at least once in your life.  STIs (sexually transmitted  infections)  Before age 24: Ask your care team if you should be screened for STIs.  After age 24: Get screened for STIs if you're at risk. You are at risk for STIs (including HIV) if:  You are sexually active with more than one person.  You don't use condoms every time.  You or a partner was diagnosed with a sexually transmitted infection.  If you are at risk for HIV, ask about PrEP medicine to prevent HIV.  Get tested for HIV at least once in your life, whether you are at risk for HIV or not.  Cancer screening tests  Cervical cancer screening: If you have a cervix, begin getting regular cervical cancer screening tests at age 21. Most people who have regular screenings with normal results can stop after age 65. Talk about this with your provider.  Breast cancer scan (mammogram): If you've ever had breasts, begin having regular mammograms starting at age 40. This is a scan to check for breast cancer.  Colon cancer screening: It is important to start screening for colon cancer at age 45.  Have a colonoscopy test every 10 years (or more often if you're at risk) Or, ask your provider about stool tests like a FIT test every year or Cologuard test every 3 years.  To learn more about your testing options, visit: https://www.[employer]/079389.pdf.  For help making a decision, visit: https://bit.ly/as18309.  Prostate cancer screening test: If you have a prostate and are age 55 to 69, ask your provider if you would benefit from a yearly prostate cancer screening test.  Lung cancer screening: If you are a current or former smoker age 50 to 80, ask your care team if ongoing lung cancer screenings are right for you.  For informational purposes only. Not to replace the advice of your health care provider. Copyright   2023 [employer]. All rights reserved. Clinically reviewed by the [facility] Transitions Program. [employer] 905411 - REV 01/24.    Safer Sex: Care Instructions  Overview  Safer sex is a way to  reduce your risk of getting a sexually transmitted infection (STI). It can also help prevent pregnancy.  Several products can help you practice safer sex and reduce your chance of STIs. One of the best is a condom. There are internal and external condoms. You can use a special rubber sheet (dental dam) for protection during oral sex. Disposable gloves can keep your hands from touching blood, semen, or other body fluids that can carry infections.  Remember that birth control methods such as diaphragms, IUDs, foams, and birth control pills do not stop you from getting STIs.  Follow-up care is a key part of your treatment and safety. Be sure to make and go to all appointments, and call your doctor if you are having problems. It's also a good idea to know your test results and keep a list of the medicines you take.  How can you care for yourself at home?  Think about getting vaccinated to help prevent hepatitis A, hepatitis B, and human papillomavirus (HPV). They can be spread through sex.  Use a condom every time you have sex. Use an external condom, which goes on the penis. Or use an internal condom, which goes into the vagina or anus.  Make sure you use the right size external condom. A condom that's too small can break easily. A condom that's too big can slip off during sex.  Use a new condom each time you have sex. Be careful not to poke a hole in the condom when you open the wrapper.  Don't use an internal condom and an external condom at the same time.  Never use petroleum jelly (such as Vaseline), grease, hand lotion, baby oil, or anything with oil in it. These products can make holes in the condom.  After intercourse, hold the edge of the condom as you remove it. This will help keep semen from spilling out of the condom.  Do not have sex with anyone who has symptoms of an STI, such as sores on the genitals or mouth.  Do not drink a lot of alcohol or use drugs before sex.  Limit your sex partners. Sex with one  "partner who has sex only with you can reduce your risk of getting an STI.  Don't share sex toys. But if you do share them, use a condom and clean the sex toys between each use.  Talk to any partners before you have sex. Talk about what you feel comfortable with and whether you have any boundaries with sex. And find out if your partner or partners may be at risk for any STI. Keep in mind that a person may be able to spread an STI even if they do not have symptoms. You and any partners may want to get tested for STIs.  Where can you learn more?  Go to https://www.[employer].net/patiented  Enter B608 in the search box to learn more about \"Safer Sex: Care Instructions.\"  Current as of: November 27, 2023               Content Version: 14.0    5704-6311 [employer].   Care instructions adapted under license by your healthcare professional. If you have questions about a medical condition or this instruction, always ask your healthcare professional. Healthwise, for; to (do) Centers disclaims any warranty or liability for your use of this information.      "

## 2024-03-20 NOTE — PROGRESS NOTES
"Preventive Care Visit  [facility]  Efrain Che MD, Family Medicine  Mar 20, 2024      Assessment & Plan     Routine general medical examination at a health care facility  Screening labs as noted.  Really doing pretty well.  We discussed when colonoscopy was due.  We discussed repeating PSA now.  - Vitamin B12  - Hemoglobin A1c  - PSA, screen  - CBC with platelets  - TSH with free T4 reflex  - Basic metabolic panel  (Ca, Cl, CO2, Creat, Gluc, K, Na, BUN)    Routine screening for STI (sexually transmitted infection)  Screening as below..  Reports he is no longer in need of sildenafil.  - HIV Antigen Antibody Combo  - Treponema Abs w Reflex to RPR and Titer    BMI  Estimated body mass index is 26.58 kg/m  as calculated from the following:    Height as of this encounter: 1.88 m (6' 2\").    Weight as of this encounter: 93.9 kg (207 lb).     Counseling  Appropriate preventive services were discussed with this patient, including applicable screening as appropriate for fall prevention, nutrition, physical activity, Tobacco-use cessation, weight loss and cognition.  Checklist reviewing preventive services available has been given to the patient.  Reviewed patient's diet, addressing concerns and/or questions.     Re Blanca is a 59 year old, presenting for the following:  Physical        3/20/2024     3:31 PM   Additional Questions   Roomed by Marcus   Accompanied by self        Health Care Directive  Patient does not have a Health Care Directive or Living Will: Discussed advance care planning with patient; information given to patient to review.    HPI  Active gentleman doing [employer] work.  Single parent that is now 24-year-old for 20 years.  That young man is playing baseball at [employer].  Plays in a band, [employer].        3/20/2024   General Health   How would you rate your overall physical health? Good   Feel stress (tense, anxious, or unable to sleep) Not at all         " 3/20/2024   Nutrition   Three or more servings of calcium each day? (!) NO   Diet: Regular (no restrictions)   How many servings of fruit and vegetables per day? (!) 2-3   How many sweetened beverages each day? 0-1         3/20/2024   Exercise   Days per week of moderate/strenous exercise 4 days   Average minutes spent exercising at this level 150+ min         3/20/2024   Social Factors   Frequency of gathering with friends or relatives Once a week   Worry food won't last until get money to buy more No   Food not last or not have enough money for food? No   Do you have housing?  Yes   Are you worried about losing your housing? No   Lack of transportation? No   Unable to get utilities (heat,electricity)? No            3/20/2024   Dental   Dentist two times every year? Yes         3/20/2024   TB Screening   Were you born outside of the [place]? No     Today's PHQ-2 Score:       3/20/2024     3:27 PM   PHQ-2 ( 1999 Pfizer)   Q1: Little interest or pleasure in doing things 0   Q2: Feeling down, depressed or hopeless 0   PHQ-2 Score 0   Q1: Little interest or pleasure in doing things Not at all   Q2: Feeling down, depressed or hopeless Not at all   PHQ-2 Score 0           3/20/2024   Substance Use   Alcohol more than 3/day or more than 7/wk No   Do you use any other substances recreationally? No     Social History     Tobacco Use    Smoking status: Never     Passive exposure: Never    Smokeless tobacco: Never   Vaping Use    Vaping Use: Never used   Substance Use Topics    Alcohol use: Yes     Comment: Alcoholic Drinks/day: occaisional    Drug use: No           3/20/2024   STI Screening   New sexual partner(s) since last STI/HIV test? (!) YES    Last PSA:   Prostate Specific Antigen Screen   Date Value Ref Range Status   04/03/2017 0.3 0.0 - 3.5 ng/mL Final     PSA Tumor Marker   Date Value Ref Range Status   05/25/2022 0.38 0.00 - 3.50 ug/L Final     ASCVD Risk   The 10-year ASCVD risk score (Solitario MALONE, et al.,  2019) is: 6.9%    Values used to calculate the score:      Age: 59 years      Sex: Male      Is Non- : No      Diabetic: No      Tobacco smoker: No      Systolic Blood Pressure: 128 mmHg      Is BP treated: No      HDL Cholesterol: 47 mg/dL      Total Cholesterol: 150 mg/dL    Reviewed and updated as needed this visit by Provider       Med Hx  Surg Hx  Fam Hx            History reviewed. No pertinent past medical history.  History reviewed. No pertinent surgical history.    BP Readings from Last 3 Encounters:   24 128/74   22 129/76   22 136/82    Wt Readings from Last 3 Encounters:   24 93.9 kg (207 lb)   22 86.6 kg (191 lb)   22 90.7 kg (200 lb)         Patient Active Problem List   Diagnosis    Adenomatous colon polyp     History reviewed. No pertinent surgical history.    Social History     Tobacco Use    Smoking status: Never     Passive exposure: Never    Smokeless tobacco: Never   Substance Use Topics    Alcohol use: Yes     Comment: Alcoholic Drinks/day: occaisional     Family History   Problem Relation Age of Onset    Lung Cancer Mother     Heart Failure Father          in his 70s    Diabetes Type 2  Father     Lung Cancer Sister     Allergies Brother     Asthma Brother          Current Outpatient Medications   Medication Sig Dispense Refill    sildenafil (REVATIO) 20 MG tablet 2-3 po 1 hour prior to sex as needed (Patient not taking: Reported on 3/20/2024) 30 tablet 3     No Known Allergies  Recent Labs   Lab Test 24  1631 22  0946 19  1534 17  1603   A1C 5.5  --   --   --    LDL  --  92  --  73   HDL  --  47  --  44   TRIG  --  53  --  111   ALT  --  16 14  --    CR  --  0.71  --   --    GFRESTIMATED  --  >90  --   --    POTASSIUM  --  4.2  --   --    TSH  --  2.10  --   --            Objective    Exam  /74 (BP Location: Right arm, Patient Position: Sitting, Cuff Size: Adult Regular)   Pulse 72   Temp 97.4  " F (36.3  C) (Temporal)   Resp 16   Ht 1.88 m (6' 2\")   Wt 93.9 kg (207 lb)   SpO2 99%   BMI 26.58 kg/m     Estimated body mass index is 26.58 kg/m  as calculated from the following:    Height as of this encounter: 1.88 m (6' 2\").    Weight as of this encounter: 93.9 kg (207 lb).    Physical Exam  Gen:   Alert, not distressed  Head:   Normocephalic, without obvious abnormality, atraumatic  Eyes:   PERRL, conjunctiva/corneas clear, EOM's intact  Ears:   Normal tympanic membranes and external ear canals  Nose:    Mucosa normal, no drainage or sinus tenderness  Throat:    No erythema or exudates  Neck:    No adenopathy no nodules in thyroid, normal ROM  Lungs:    Clear to auscultation bilaterally, respirations unlabored  Chest wall:  No tenderness or deformity  Heart:     Regular, normal S1 and S2, no murmur, gallop or rub  Abdomen:  Soft, non-tender, normal bowel sounds, no masses, no organomegaly  Back:    Symmetric, no curvature, ROM normal, no CVA tenderness  Extremities:   Extremities normal, atraumatic, no cyanosis or edema  Skin:     Skin color, texture, turgor normal, no rashes or lesions  Lymph nodes:   Cervical and supraclavicular nodes normal  Neurologic:   CNII-XII intact.   DTRs normal and symmetric.  Symmetric strength and sensation.          Signed Electronically by: Efrain Che MD    "

## 2024-03-21 LAB
ANION GAP SERPL CALCULATED.3IONS-SCNC: 8 MMOL/L (ref 7–15)
BUN SERPL-MCNC: 17.2 MG/DL (ref 8–23)
C TRACH DNA SPEC QL PROBE+SIG AMP: NEGATIVE
CALCIUM SERPL-MCNC: 9.1 MG/DL (ref 8.8–10.2)
CHLORIDE SERPL-SCNC: 102 MMOL/L (ref 98–107)
CREAT SERPL-MCNC: 0.94 MG/DL (ref 0.67–1.17)
DEPRECATED HCO3 PLAS-SCNC: 28 MMOL/L (ref 22–29)
EGFRCR SERPLBLD CKD-EPI 2021: >90 ML/MIN/1.73M2
GLUCOSE SERPL-MCNC: 72 MG/DL (ref 70–99)
HIV 1+2 AB+HIV1 P24 AG SERPL QL IA: NONREACTIVE
N GONORRHOEA DNA SPEC QL NAA+PROBE: NEGATIVE
POTASSIUM SERPL-SCNC: 3.9 MMOL/L (ref 3.4–5.3)
PSA SERPL DL<=0.01 NG/ML-MCNC: 0.38 NG/ML (ref 0–4.5)
SODIUM SERPL-SCNC: 138 MMOL/L (ref 135–145)
TSH SERPL DL<=0.005 MIU/L-ACNC: 1.9 UIU/ML (ref 0.3–4.2)
VIT B12 SERPL-MCNC: 578 PG/ML (ref 232–1245)

## 2025-02-18 ENCOUNTER — PATIENT OUTREACH (OUTPATIENT)
Dept: CARE COORDINATION | Facility: CLINIC | Age: 60
End: 2025-02-18
Payer: COMMERCIAL

## 2025-03-04 ENCOUNTER — PATIENT OUTREACH (OUTPATIENT)
Dept: CARE COORDINATION | Facility: CLINIC | Age: 60
End: 2025-03-04
Payer: COMMERCIAL

## 2025-04-01 ENCOUNTER — OFFICE VISIT (OUTPATIENT)
Dept: FAMILY MEDICINE | Facility: CLINIC | Age: 60
End: 2025-04-01
Payer: COMMERCIAL

## 2025-04-01 VITALS
DIASTOLIC BLOOD PRESSURE: 78 MMHG | BODY MASS INDEX: 25.8 KG/M2 | OXYGEN SATURATION: 99 % | SYSTOLIC BLOOD PRESSURE: 123 MMHG | WEIGHT: 201 LBS | HEART RATE: 60 BPM | HEIGHT: 74 IN | RESPIRATION RATE: 16 BRPM | TEMPERATURE: 97.7 F

## 2025-04-01 DIAGNOSIS — J02.9 SORE THROAT: Primary | ICD-10-CM

## 2025-04-01 DIAGNOSIS — H61.21 IMPACTED CERUMEN OF RIGHT EAR: ICD-10-CM

## 2025-04-01 LAB
DEPRECATED S PYO AG THROAT QL EIA: NEGATIVE
S PYO DNA THROAT QL NAA+PROBE: NOT DETECTED

## 2025-04-01 PROCEDURE — 87651 STREP A DNA AMP PROBE: CPT | Performed by: FAMILY MEDICINE

## 2025-04-01 PROCEDURE — 3074F SYST BP LT 130 MM HG: CPT | Performed by: FAMILY MEDICINE

## 2025-04-01 PROCEDURE — 99213 OFFICE O/P EST LOW 20 MIN: CPT | Performed by: FAMILY MEDICINE

## 2025-04-01 PROCEDURE — 3078F DIAST BP <80 MM HG: CPT | Performed by: FAMILY MEDICINE

## 2025-04-01 RX ORDER — ACETAMINOPHEN 160 MG
1 TABLET,DISINTEGRATING ORAL SEE ADMIN INSTRUCTIONS
OUTPATIENT
Start: 2025-04-08

## 2025-04-01 ASSESSMENT — ENCOUNTER SYMPTOMS: SORE THROAT: 1

## 2025-04-13 ASSESSMENT — ENCOUNTER SYMPTOMS
ABDOMINAL PAIN: 0
COLOR CHANGE: 0
SEIZURES: 0
SHORTNESS OF BREATH: 0
BACK PAIN: 0
VOMITING: 0
PALPITATIONS: 0
ARTHRALGIAS: 0
FEVER: 0
COUGH: 0
CHILLS: 0
HEMATURIA: 0
DYSURIA: 0
EYE PAIN: 0

## 2025-05-14 ENCOUNTER — OFFICE VISIT (OUTPATIENT)
Dept: FAMILY MEDICINE | Facility: CLINIC | Age: 60
End: 2025-05-14
Payer: COMMERCIAL

## 2025-05-14 VITALS
WEIGHT: 195 LBS | HEART RATE: 65 BPM | HEIGHT: 74 IN | DIASTOLIC BLOOD PRESSURE: 76 MMHG | BODY MASS INDEX: 25.03 KG/M2 | SYSTOLIC BLOOD PRESSURE: 110 MMHG | TEMPERATURE: 97.4 F | RESPIRATION RATE: 26 BRPM | OXYGEN SATURATION: 98 %

## 2025-05-14 DIAGNOSIS — Z00.00 ROUTINE GENERAL MEDICAL EXAMINATION AT A HEALTH CARE FACILITY: Primary | ICD-10-CM

## 2025-05-14 DIAGNOSIS — N52.9 ERECTILE DYSFUNCTION, UNSPECIFIED ERECTILE DYSFUNCTION TYPE: ICD-10-CM

## 2025-05-14 DIAGNOSIS — Z13.220 LIPID SCREENING: ICD-10-CM

## 2025-05-14 DIAGNOSIS — Z12.5 SCREENING FOR PROSTATE CANCER: ICD-10-CM

## 2025-05-14 LAB
CHOLEST SERPL-MCNC: 160 MG/DL
FASTING STATUS PATIENT QL REPORTED: ABNORMAL
HDLC SERPL-MCNC: 49 MG/DL
LDLC SERPL CALC-MCNC: 102 MG/DL
NONHDLC SERPL-MCNC: 111 MG/DL
PSA SERPL DL<=0.01 NG/ML-MCNC: 0.36 NG/ML (ref 0–4.5)
TRIGL SERPL-MCNC: 47 MG/DL

## 2025-05-14 PROCEDURE — 99213 OFFICE O/P EST LOW 20 MIN: CPT | Mod: 25 | Performed by: FAMILY MEDICINE

## 2025-05-14 PROCEDURE — G2211 COMPLEX E/M VISIT ADD ON: HCPCS | Performed by: FAMILY MEDICINE

## 2025-05-14 PROCEDURE — 90471 IMMUNIZATION ADMIN: CPT | Performed by: FAMILY MEDICINE

## 2025-05-14 PROCEDURE — 36415 COLL VENOUS BLD VENIPUNCTURE: CPT | Performed by: FAMILY MEDICINE

## 2025-05-14 PROCEDURE — 3074F SYST BP LT 130 MM HG: CPT | Performed by: FAMILY MEDICINE

## 2025-05-14 PROCEDURE — G0103 PSA SCREENING: HCPCS | Performed by: FAMILY MEDICINE

## 2025-05-14 PROCEDURE — 99396 PREV VISIT EST AGE 40-64: CPT | Mod: 25 | Performed by: FAMILY MEDICINE

## 2025-05-14 PROCEDURE — 80061 LIPID PANEL: CPT | Performed by: FAMILY MEDICINE

## 2025-05-14 PROCEDURE — 3078F DIAST BP <80 MM HG: CPT | Performed by: FAMILY MEDICINE

## 2025-05-14 PROCEDURE — 90715 TDAP VACCINE 7 YRS/> IM: CPT | Performed by: FAMILY MEDICINE

## 2025-05-14 RX ORDER — SILDENAFIL CITRATE 20 MG/1
TABLET ORAL
Qty: 30 TABLET | Refills: 3 | Status: SHIPPED | OUTPATIENT
Start: 2025-05-14

## 2025-05-14 SDOH — HEALTH STABILITY: PHYSICAL HEALTH: ON AVERAGE, HOW MANY DAYS PER WEEK DO YOU ENGAGE IN MODERATE TO STRENUOUS EXERCISE (LIKE A BRISK WALK)?: 6 DAYS

## 2025-05-14 SDOH — HEALTH STABILITY: PHYSICAL HEALTH: ON AVERAGE, HOW MANY MINUTES DO YOU ENGAGE IN EXERCISE AT THIS LEVEL?: PATIENT DECLINED

## 2025-05-14 ASSESSMENT — SOCIAL DETERMINANTS OF HEALTH (SDOH): HOW OFTEN DO YOU GET TOGETHER WITH FRIENDS OR RELATIVES?: ONCE A WEEK

## 2025-05-14 NOTE — PROGRESS NOTES
"Preventive Care Visit  [facility]  Efrain Che MD, Family Medicine  May 14, 2025      Assessment & Plan     Routine general medical examination at a health care facility    Erectile dysfunction, unspecified erectile dysfunction type  Discussed symptoms.  Discussed med use.  Can be helpful.  Titrate dose up to 100 or less prn  - sildenafil (REVATIO) 20 MG tablet  Dispense: 30 tablet; Refill: 3    Lipid screening  Repeat screen   - Lipid panel reflex to direct LDL Non-fasting    Screening for prostate cancer  We discussed the risks, benefits, and alternatives to screening  He elects to proceed.  - PSA, screen      Patient has been advised of split billing requirements and indicates understanding: Yes        BMI  Estimated body mass index is 25.04 kg/m  as calculated from the following:    Height as of this encounter: 1.88 m (6' 2\").    Weight as of this encounter: 88.5 kg (195 lb).       Counseling  Appropriate preventive services were addressed with this patient via screening, questionnaire, or discussion as appropriate for fall prevention, nutrition, physical activity, Tobacco-use cessation, social engagement, weight loss and cognition.  Checklist reviewing preventive services available has been given to the patient.  Reviewed patient's diet, addressing concerns and/or questions.       Re Blanca is a 60 year old, presenting for the following:  Physical        5/14/2025     8:40 AM   Additional Questions   Roomed by M   Accompanied by self          HPI  ED concerns.  On and off.  Thought maybe due to relationship problems in the past.  Seems maybe not so much like that now.    Otherwise, doing well             Advance Care Planning    Discussed advance care planning with patient; however, patient declined at this time.        5/14/2025   General Health   How would you rate your overall physical health? Good   Feel stress (tense, anxious, or unable to sleep) Not at all         " 5/14/2025   Nutrition   Three or more servings of calcium each day? (!) NO   Diet: Regular (no restrictions)   How many servings of fruit and vegetables per day? (!) 2-3   How many sweetened beverages each day? 0-1         5/14/2025   Exercise   Days per week of moderate/strenous exercise 6 days   Average minutes spent exercising at this level Patient declined         5/14/2025   Social Factors   Frequency of gathering with friends or relatives Once a week   Worry food won't last until get money to buy more No   Food not last or not have enough money for food? No   Do you have housing? (Housing is defined as stable permanent housing and does not include staying outside in a car, in a tent, in an abandoned building, in an overnight shelter, or couch-surfing.) Yes   Are you worried about losing your housing? No   Lack of transportation? No   Unable to get utilities (heat,electricity)? No         5/14/2025   Fall Risk   Fallen 2 or more times in the past year? No   Trouble with walking or balance? No          5/14/2025   Dental   Dentist two times every year? Yes         Today's PHQ-2 Score:       5/14/2025     8:36 AM   PHQ-2 ( 1999 [employer])   Q1: Little interest or pleasure in doing things 0   Q2: Feeling down, depressed or hopeless 0   PHQ-2 Score 0    Q1: Little interest or pleasure in doing things Not at all   Q2: Feeling down, depressed or hopeless Not at all   PHQ-2 Score 0       Patient-reported           5/14/2025   Substance Use   Alcohol more than 3/day or more than 7/wk No   Do you use any other substances recreationally? No     Social History     Tobacco Use    Smoking status: Never     Passive exposure: Never    Smokeless tobacco: Never   Vaping Use    Vaping status: Never Used   Substance Use Topics    Alcohol use: Yes     Comment: Alcoholic Drinks/day: occaisional    Drug use: No           5/14/2025   STI Screening   New sexual partner(s) since last STI/HIV test? No   Last PSA:   Prostate Specific  Antigen Screen   Date Value Ref Range Status   2024 0.38 0.00 - 4.50 ng/mL Final   2017 0.3 0.0 - 3.5 ng/mL Final     PSA Tumor Marker   Date Value Ref Range Status   2022 0.38 0.00 - 3.50 ug/L Final     ASCVD Risk   The 10-year ASCVD risk score (Solitario MALONE, et al., 2019) is: 5.9%    Values used to calculate the score:      Age: 60 years      Sex: Male      Is Non- : No      Diabetic: No      Tobacco smoker: No      Systolic Blood Pressure: 110 mmHg      Is BP treated: No      HDL Cholesterol: 47 mg/dL      Total Cholesterol: 150 mg/dL           Reviewed and updated as needed this visit by Provider   Tobacco  Allergies  Meds  Problems  Med Hx  Surg Hx  Fam Hx            History reviewed. No pertinent past medical history.  History reviewed. No pertinent surgical history.  BP Readings from Last 3 Encounters:   25 110/76   25 123/78   24 128/74    Wt Readings from Last 3 Encounters:   25 88.5 kg (195 lb)   25 91.2 kg (201 lb)   24 93.9 kg (207 lb)                  Patient Active Problem List   Diagnosis    Adenomatous colon polyp     History reviewed. No pertinent surgical history.    Social History     Tobacco Use    Smoking status: Never     Passive exposure: Never    Smokeless tobacco: Never   Substance Use Topics    Alcohol use: Yes     Comment: Alcoholic Drinks/day: occaisional     Family History   Problem Relation Age of Onset    Lung Cancer Mother     Heart Failure Father          in his 70s    Diabetes Type 2  Father     Lung Cancer Sister     Allergies Brother     Asthma Brother          Current Outpatient Medications   Medication Sig Dispense Refill    sildenafil (REVATIO) 20 MG tablet 2-3 po 1 hour prior to sex as needed 30 tablet 3     No Known Allergies  Recent Labs   Lab Test 24  1631 22  0946 19  1534 17  1603   A1C 5.5  --   --   --    LDL  --  92  --  73   HDL  --  47  --  44   TRIG  " --  53  --  111   ALT  --  16 14  --    CR 0.94 0.71  --   --    GFRESTIMATED >90 >90  --   --    POTASSIUM 3.9 4.2  --   --    TSH 1.90 2.10  --   --          Objective    Exam  /76 (BP Location: Right arm, Patient Position: Sitting, Cuff Size: Adult Regular)   Pulse 65   Temp 97.4  F (36.3  C) (Temporal)   Resp 26   Ht 1.88 m (6' 2\")   Wt 88.5 kg (195 lb)   SpO2 98%   BMI 25.04 kg/m     Estimated body mass index is 25.04 kg/m  as calculated from the following:    Height as of this encounter: 1.88 m (6' 2\").    Weight as of this encounter: 88.5 kg (195 lb).    Physical Exam  Gen:   Alert, not distressed  Head:   Normocephalic, without obvious abnormality, atraumatic  Eyes:   PERRL, conjunctiva/corneas clear, EOM's intact  Ears:   Normal tympanic membranes and external ear canals  Nose:    Mucosa normal, no drainage or sinus tenderness  Throat:    No erythema or exudates  Neck:    No adenopathy no nodules in thyroid, normal ROM  Lungs:    Clear to auscultation bilaterally, respirations unlabored  Chest wall:  No tenderness or deformity  Heart:     Regular, normal S1 and S2, no murmur, gallop or rub  Abdomen:  Soft, non-tender, normal bowel sounds, no masses, no organomegaly  Back:    Symmetric, no curvature, ROM normal, no CVA tenderness  Extremities:   Extremities normal, atraumatic, no cyanosis or edema  Skin:     Skin color, texture, turgor normal, no rashes or lesions  Lymph nodes:   Cervical and supraclavicular nodes normal  Neurologic:   CNII-XII intact.   DTRs normal and symmetric.  Symmetric strength and sensation.          Signed Electronically by: Efrain Che MD    "

## 2025-05-14 NOTE — PROGRESS NOTES
Post Ear Irrigation exam completed and tympanic membrane intact and small amount of ear wax still in left ear present.  Pain assessment completed and patient having mild left ear pain. Patient will use OTC Debrox to soften ear wax.    Patient tolerated procedure:  yes    Courtney Mixon RN  [facility]

## 2025-05-14 NOTE — PATIENT INSTRUCTIONS
Patient Education   Preventive Care Advice   This is general advice given by our system to help you stay healthy. However, your care team may have specific advice just for you. Please talk to your care team about your preventive care needs.  Nutrition  Eat 5 or more servings of fruits and vegetables each day.  Try wheat bread, brown rice and whole grain pasta (instead of white bread, rice, and pasta).  Get enough calcium and vitamin D. Check the label on foods and aim for 100% of the RDA (recommended daily allowance).  Lifestyle  Exercise at least 150 minutes each week  (30 minutes a day, 5 days a week).  Do muscle strengthening activities 2 days a week. These help control your weight and prevent disease.  No smoking.  Wear sunscreen to prevent skin cancer.  Have a dental exam and cleaning every 6 months.  Yearly exams  See your health care team every year to talk about:  Any changes in your health.  Any medicines your care team has prescribed.  Preventive care, family planning, and ways to prevent chronic diseases.  Shots (vaccines)   HPV shots (up to age 26), if you've never had them before.  Hepatitis B shots (up to age 59), if you've never had them before.  COVID-19 shot: Get this shot when it's due.  Flu shot: Get a flu shot every year.  Tetanus shot: Get a tetanus shot every 10 years.  Pneumococcal, hepatitis A, and RSV shots: Ask your care team if you need these based on your risk.  Shingles shot (for age 50 and up)  General health tests  Diabetes screening:  Starting at age 35, Get screened for diabetes at least every 3 years.  If you are younger than age 35, ask your care team if you should be screened for diabetes.  Cholesterol test: At age 39, start having a cholesterol test every 5 years, or more often if advised.  Bone density scan (DEXA): At age 50, ask your care team if you should have this scan for osteoporosis (brittle bones).  Hepatitis C: Get tested at least once in your life.  STIs (sexually  transmitted infections)  Before age 24: Ask your care team if you should be screened for STIs.  After age 24: Get screened for STIs if you're at risk. You are at risk for STIs (including HIV) if:  You are sexually active with more than one person.  You don't use condoms every time.  You or a partner was diagnosed with a sexually transmitted infection.  If you are at risk for HIV, ask about PrEP medicine to prevent HIV.  Get tested for HIV at least once in your life, whether you are at risk for HIV or not.  Cancer screening tests  Cervical cancer screening: If you have a cervix, begin getting regular cervical cancer screening tests starting at age 21.  Breast cancer scan (mammogram): If you've ever had breasts, begin having regular mammograms starting at age 40. This is a scan to check for breast cancer.  Colon cancer screening: It is important to start screening for colon cancer at age 45.  Have a colonoscopy test every 10 years (or more often if you're at risk) Or, ask your provider about stool tests like a FIT test every year or Cologuard test every 3 years.  To learn more about your testing options, visit:   .  For help making a decision, visit:   https://bit.ly/uf35528.  Prostate cancer screening test: If you have a prostate, ask your care team if a prostate cancer screening test (PSA) at age 55 is right for you.  Lung cancer screening: If you are a current or former smoker ages 50 to 80, ask your care team if ongoing lung cancer screenings are right for you.  For informational purposes only. Not to replace the advice of your health care provider. Copyright   2023 [employer]. All rights reserved. Clinically reviewed by the [facility]. [employer] [id number] - REV 01/24.

## 2025-05-14 NOTE — PROGRESS NOTES
Prior to immunization administration, verified patients identity using patient s name and date of birth. Please see Immunization Activity for additional information.     Screening Questionnaire for Adult Immunization    Are you sick today?   No   Do you have allergies to medications, food, a vaccine component or latex?   No   Have you ever had a serious reaction after receiving a vaccination?   No   Do you have a long-term health problem with heart, lung, kidney, or metabolic disease (e.g., diabetes), asthma, a blood disorder, no spleen, complement component deficiency, a cochlear implant, or a spinal fluid leak?  Are you on long-term aspirin therapy?   No   Do you have cancer, leukemia, HIV/AIDS, or any other immune system problem?   No   Do you have a parent, brother, or sister with an immune system problem?   No   In the past 3 months, have you taken medications that affect  your immune system, such as prednisone, other steroids, or anticancer drugs; drugs for the treatment of rheumatoid arthritis, Crohn s disease, or psoriasis; or have you had radiation treatments?   No   Have you had a seizure, or a brain or other nervous system problem?   No   During the past year, have you received a transfusion of blood or blood    products, or been given immune (gamma) globulin or antiviral drug?   No   For women: Are you pregnant or is there a chance you could become       pregnant during the next month?   No   Have you received any vaccinations in the past 4 weeks?   No     Immunization questionnaire answers were all negative.      Patient instructed to remain in clinic for 15 minutes afterwards, and to report any adverse reactions.     Screening performed by LENNIE Gregory MA on 5/14/2025 at 8:43 AM.

## 2025-05-16 ENCOUNTER — RESULTS FOLLOW-UP (OUTPATIENT)
Dept: FAMILY MEDICINE | Facility: CLINIC | Age: 60
End: 2025-05-16

## 2025-09-04 ENCOUNTER — OFFICE VISIT (OUTPATIENT)
Dept: INTERNAL MEDICINE | Facility: CLINIC | Age: 60
End: 2025-09-04
Payer: COMMERCIAL

## 2025-09-04 VITALS
DIASTOLIC BLOOD PRESSURE: 80 MMHG | SYSTOLIC BLOOD PRESSURE: 124 MMHG | OXYGEN SATURATION: 96 % | WEIGHT: 191 LBS | HEART RATE: 73 BPM | HEIGHT: 74 IN | TEMPERATURE: 98 F | BODY MASS INDEX: 24.51 KG/M2 | RESPIRATION RATE: 14 BRPM

## 2025-09-04 DIAGNOSIS — J02.9 ACUTE PHARYNGITIS, UNSPECIFIED ETIOLOGY: Primary | ICD-10-CM

## 2025-09-04 LAB
DEPRECATED S PYO AG THROAT QL EIA: NEGATIVE
S PYO DNA THROAT QL NAA+PROBE: NOT DETECTED

## 2025-09-04 ASSESSMENT — ENCOUNTER SYMPTOMS: SORE THROAT: 1
